# Patient Record
Sex: FEMALE | Race: WHITE | Employment: FULL TIME | URBAN - NONMETROPOLITAN AREA
[De-identification: names, ages, dates, MRNs, and addresses within clinical notes are randomized per-mention and may not be internally consistent; named-entity substitution may affect disease eponyms.]

---

## 2017-10-04 ENCOUNTER — OFFICE VISIT (OUTPATIENT)
Dept: FAMILY MEDICINE CLINIC | Age: 37
End: 2017-10-04
Payer: MEDICAID

## 2017-10-04 ENCOUNTER — TELEPHONE (OUTPATIENT)
Dept: FAMILY MEDICINE CLINIC | Age: 37
End: 2017-10-04

## 2017-10-04 VITALS
OXYGEN SATURATION: 97 % | HEART RATE: 72 BPM | TEMPERATURE: 97.7 F | BODY MASS INDEX: 29.72 KG/M2 | WEIGHT: 178.6 LBS | RESPIRATION RATE: 22 BRPM | DIASTOLIC BLOOD PRESSURE: 80 MMHG | SYSTOLIC BLOOD PRESSURE: 120 MMHG

## 2017-10-04 DIAGNOSIS — H66.003 ACUTE SUPPURATIVE OTITIS MEDIA OF BOTH EARS WITHOUT SPONTANEOUS RUPTURE OF TYMPANIC MEMBRANES, RECURRENCE NOT SPECIFIED: Primary | ICD-10-CM

## 2017-10-04 PROCEDURE — 99213 OFFICE O/P EST LOW 20 MIN: CPT | Performed by: NURSE PRACTITIONER

## 2017-10-04 RX ORDER — CEFTRIAXONE 1 G/1
1 INJECTION, POWDER, FOR SOLUTION INTRAMUSCULAR; INTRAVENOUS EVERY 24 HOURS
Status: SHIPPED | OUTPATIENT
Start: 2017-10-04 | End: 2017-10-08

## 2017-10-04 RX ORDER — CETIRIZINE HYDROCHLORIDE, PSEUDOEPHEDRINE HYDROCHLORIDE 5; 120 MG/1; MG/1
1 TABLET, FILM COATED, EXTENDED RELEASE ORAL 2 TIMES DAILY
Qty: 60 TABLET | Refills: 0 | Status: SHIPPED | OUTPATIENT
Start: 2017-10-04 | End: 2017-11-03

## 2017-10-04 RX ORDER — CIPROFLOXACIN HYDROCHLORIDE 3.5 MG/ML
2 SOLUTION/ DROPS TOPICAL
Qty: 240 DROP | Refills: 0 | Status: SHIPPED | OUTPATIENT
Start: 2017-10-04 | End: 2017-10-14

## 2017-10-04 RX ORDER — CEFUROXIME AXETIL 500 MG/1
500 TABLET ORAL 2 TIMES DAILY
Qty: 20 TABLET | Refills: 0 | Status: SHIPPED | OUTPATIENT
Start: 2017-10-04 | End: 2017-10-14

## 2017-10-04 ASSESSMENT — ENCOUNTER SYMPTOMS
VOMITING: 1
NAUSEA: 1
COUGH: 1
SORE THROAT: 1

## 2017-10-04 NOTE — PROGRESS NOTES
Constitutional: She is oriented to person, place, and time. She appears well-developed and well-nourished. HENT:   Head: Normocephalic and atraumatic. Right Ear: External ear normal. No tenderness. Tympanic membrane is bulging. Tympanic membrane is not injected. A middle ear effusion is present. Left Ear: External ear normal. No tenderness. Tympanic membrane is bulging. Tympanic membrane is not injected. A middle ear effusion is present. Nose: Nose normal. No rhinorrhea. Mouth/Throat: Oropharynx is clear and moist. No posterior oropharyngeal edema, posterior oropharyngeal erythema or tonsillar abscesses. Eyes: Conjunctivae are normal.   Neck: Normal range of motion. Neck supple. Cardiovascular: Normal rate, regular rhythm and normal heart sounds. Pulmonary/Chest: Effort normal and breath sounds normal.   Coughing in room   Abdominal: Soft. Bowel sounds are normal.   Lymphadenopathy:     She has no cervical adenopathy. Neurological: She is alert and oriented to person, place, and time. Skin: Skin is warm and dry. Psychiatric: She has a normal mood and affect. /80 (Site: Left Arm, Position: Sitting, Cuff Size: Large Adult)  Pulse 72  Temp 97.7 °F (36.5 °C) (Oral)   Resp 22  Wt 178 lb 9.6 oz (81 kg)  SpO2 97%  BMI 29.72 kg/m2    Assessment:          1. Acute suppurative otitis media of both ears without spontaneous rupture of tympanic membranes, recurrence not specified  cefTRIAXone (ROCEPHIN) injection 1 g    cefUROXime (CEFTIN) 500 MG tablet    ciprofloxacin-hydrocortisone (CIPRO HC) 0.2-1 % otic suspension    cetirizine-psuedoephedrine (ZYRTEC-D) 5-120 MG per extended release tablet        Plan:      Start Ceftin on Sunday  Pt to Call Dr. Sami Abdul and schedule an appointment.

## 2017-10-04 NOTE — MR AVS SNAPSHOT
After Visit Summary             Nathalia Perez   10/4/2017 12:15 PM   Office Visit    Description:  Female : 1980   Provider:  KETAN Tracey   Department:  [address] and Future Appointments         Below is a list of your follow-up and future appointments. This may not be a complete list as you may have made appointments directly with providers that we are not aware of or your providers may have made some for you. Please call your providers to confirm appointments. It is important to keep your appointments. Please bring your current insurance card, photo ID, co-pay, and all medication bottles to your appointment. If self-pay, payment is expected at the time of service. Your To-Do List     Future Appointments Provider Department Dept Phone    10/12/2017 8:30 AM KETAN Tracey [facility] [phone]    Please arrive 15 minutes prior to appointment, bring photo ID and insurance card. Information from Your Visit        Department     Name Address Phone Fax    [address] [phone]      You Were Seen for:         Comments    Acute suppurative otitis media of both ears without spontaneous rupture of tympanic membranes, recurrence not specified   [[id number]]         Vital Signs     Blood Pressure Pulse Temperature Respirations Weight Oxygen Saturation    120/80 (Site: Left Arm, Position: Sitting, Cuff Size: Large Adult) 72 97.7 °F (36.5 °C) (Oral) 22 178 lb 9.6 oz (81 kg) 97%    Body Mass Index Smoking Status                29.72 kg/m2 Current Every Day Smoker          Additional Information about your Body Mass Index (BMI)           Your BMI as listed above is considered overweight (25.0-29.9). BMI is an estimate of body fat, calculated from your height and weight.   The higher your BMI, the greater your risk of heart disease, high blood pressure, Stopped by:  KETAN Merchant       sertraline 100 MG tablet   Commonly known as:  ZOLOFT   Stopped by:  KETAN Merchant            Where to Get Your Medications      These medications were sent to [address], [address] [place]     Phone:  [phone]     cefUROXime 500 MG tablet    cetirizine-psuedoephedrine 5-120 MG per extended release tablet    ciprofloxacin-hydrocortisone 0.2-1 % otic suspension               Your Current Medications Are              cefUROXime (CEFTIN) 500 MG tablet Take 1 tablet by mouth 2 times daily for 10 days    ciprofloxacin-hydrocortisone (CIPRO HC) 0.2-1 % otic suspension Place 3 drops in ear(s) 2 times daily for 7 days    cetirizine-psuedoephedrine (ZYRTEC-D) 5-120 MG per extended release tablet Take 1 tablet by mouth 2 times daily    buPROPion (WELLBUTRIN) 75 MG tablet Take 1 tablet by mouth 2 times daily    levothyroxine (LEVOTHROID) 25 MCG tablet Take 1 tablet by mouth daily    Vitamin D, Cholecalciferol, 400 UNITS CAPS Take 800 Units by mouth daily    Multiple Vitamins-Minerals (MULTIVITAMIN ADULT PO) Take by mouth daily    Cetirizine-Pseudoephedrine (ZYRTEC-D PO) Take by mouth    albuterol sulfate HFA (VENTOLIN HFA) 108 (90 BASE) MCG/ACT inhaler Inhale 2 puffs into the lungs every 6 hours as needed for Wheezing      Allergies              Nsaids          Additional Information        Basic Information     Date Of Birth Sex Race Ethnicity Preferred Language    1980 Female White Non-/Non  English      Problem List as of 10/4/2017  Date Reviewed: 10/26/2016          None      Preventive Care        Date Due    HIV screening is recommended for all people regardless of risk factors  aged 15-65 years at least once (lifetime) who have never been HIV tested.  9/22/1994    Tetanus Combination Vaccine (1 - Tdap) 9/22/1998

## 2017-10-05 ENCOUNTER — NURSE ONLY (OUTPATIENT)
Dept: FAMILY MEDICINE CLINIC | Age: 37
End: 2017-10-05

## 2017-10-05 DIAGNOSIS — B96.89 ACUTE BACTERIAL SINUSITIS: Primary | ICD-10-CM

## 2017-10-05 DIAGNOSIS — J01.90 ACUTE BACTERIAL SINUSITIS: Primary | ICD-10-CM

## 2017-10-06 ENCOUNTER — NURSE ONLY (OUTPATIENT)
Dept: FAMILY MEDICINE CLINIC | Age: 37
End: 2017-10-06

## 2017-10-07 ENCOUNTER — NURSE ONLY (OUTPATIENT)
Dept: FAMILY MEDICINE CLINIC | Age: 37
End: 2017-10-07

## 2017-10-07 DIAGNOSIS — J06.9 UPPER RESPIRATORY TRACT INFECTION, UNSPECIFIED TYPE: Primary | ICD-10-CM

## 2017-10-07 PROCEDURE — 99999 PR OFFICE/OUTPT VISIT,PROCEDURE ONLY: CPT | Performed by: FAMILY MEDICINE

## 2019-03-07 ENCOUNTER — OFFICE VISIT (OUTPATIENT)
Dept: FAMILY MEDICINE CLINIC | Age: 39
End: 2019-03-07
Payer: MEDICAID

## 2019-03-07 VITALS
WEIGHT: 189 LBS | RESPIRATION RATE: 14 BRPM | HEART RATE: 78 BPM | DIASTOLIC BLOOD PRESSURE: 82 MMHG | OXYGEN SATURATION: 98 % | TEMPERATURE: 97.8 F | BODY MASS INDEX: 31.45 KG/M2 | SYSTOLIC BLOOD PRESSURE: 130 MMHG

## 2019-03-07 DIAGNOSIS — R05.9 COUGH: ICD-10-CM

## 2019-03-07 DIAGNOSIS — R06.2 WHEEZING: ICD-10-CM

## 2019-03-07 DIAGNOSIS — R68.89 FLU-LIKE SYMPTOMS: Primary | ICD-10-CM

## 2019-03-07 DIAGNOSIS — J40 BRONCHITIS: ICD-10-CM

## 2019-03-07 DIAGNOSIS — R52 BODY ACHES: ICD-10-CM

## 2019-03-07 LAB
INFLUENZA A ANTIBODY: NEGATIVE
INFLUENZA B ANTIBODY: NEGATIVE

## 2019-03-07 PROCEDURE — 87804 INFLUENZA ASSAY W/OPTIC: CPT | Performed by: INTERNAL MEDICINE

## 2019-03-07 PROCEDURE — 99213 OFFICE O/P EST LOW 20 MIN: CPT | Performed by: INTERNAL MEDICINE

## 2019-03-07 RX ORDER — PREDNISONE 20 MG/1
20 TABLET ORAL DAILY
Qty: 7 TABLET | Refills: 0 | Status: SHIPPED | OUTPATIENT
Start: 2019-03-07 | End: 2019-03-14

## 2019-03-07 RX ORDER — BENZONATATE 100 MG/1
100-200 CAPSULE ORAL 3 TIMES DAILY PRN
Qty: 60 CAPSULE | Refills: 0 | Status: SHIPPED | OUTPATIENT
Start: 2019-03-07 | End: 2019-03-14

## 2019-03-07 RX ORDER — TRIAMCINOLONE ACETONIDE 40 MG/ML
40 INJECTION, SUSPENSION INTRA-ARTICULAR; INTRAMUSCULAR ONCE
Status: COMPLETED | OUTPATIENT
Start: 2019-03-07 | End: 2019-03-07

## 2019-03-07 RX ORDER — AZITHROMYCIN 250 MG/1
250 TABLET, FILM COATED ORAL SEE ADMIN INSTRUCTIONS
Qty: 6 TABLET | Refills: 0 | Status: SHIPPED | OUTPATIENT
Start: 2019-03-07 | End: 2019-03-12

## 2019-03-07 RX ADMIN — TRIAMCINOLONE ACETONIDE 40 MG: 40 INJECTION, SUSPENSION INTRA-ARTICULAR; INTRAMUSCULAR at 09:08

## 2019-03-07 ASSESSMENT — ENCOUNTER SYMPTOMS
VOMITING: 0
BACK PAIN: 0
DIARRHEA: 0
NAUSEA: 0
SHORTNESS OF BREATH: 0
COUGH: 1
SORE THROAT: 1
CONSTIPATION: 0

## 2019-05-06 ENCOUNTER — OFFICE VISIT (OUTPATIENT)
Dept: FAMILY MEDICINE CLINIC | Age: 39
End: 2019-05-06
Payer: MEDICAID

## 2019-05-06 VITALS
OXYGEN SATURATION: 98 % | DIASTOLIC BLOOD PRESSURE: 76 MMHG | RESPIRATION RATE: 20 BRPM | BODY MASS INDEX: 31.32 KG/M2 | TEMPERATURE: 97.6 F | HEART RATE: 91 BPM | HEIGHT: 65 IN | SYSTOLIC BLOOD PRESSURE: 128 MMHG | WEIGHT: 188 LBS

## 2019-05-06 DIAGNOSIS — S90.852A FOREIGN BODY IN LEFT FOOT, INITIAL ENCOUNTER: Primary | ICD-10-CM

## 2019-05-06 PROCEDURE — 99213 OFFICE O/P EST LOW 20 MIN: CPT | Performed by: NURSE PRACTITIONER

## 2019-05-06 ASSESSMENT — PATIENT HEALTH QUESTIONNAIRE - PHQ9
1. LITTLE INTEREST OR PLEASURE IN DOING THINGS: 0
SUM OF ALL RESPONSES TO PHQ QUESTIONS 1-9: 0
SUM OF ALL RESPONSES TO PHQ9 QUESTIONS 1 & 2: 0
2. FEELING DOWN, DEPRESSED OR HOPELESS: 0
SUM OF ALL RESPONSES TO PHQ QUESTIONS 1-9: 0

## 2019-05-06 NOTE — PROGRESS NOTES
SUBJECTIVE:  Here today for stepping on something. Patient ID: Beverlee Schwab is a 43 y.o. female. HPI:   HPI   Stepped on something 2 months ago. Did not bleed, thinks it was something plastic on the floor, Keeps getting worse , no drainage no fever just a hardened area around it and tender to touch about on the head of the pain is secondary 3rd metatarsal walking on the side of her foot. Past Medical History:   Diagnosis Date    Cancer [facility])     ovarian cancer    Hyperlipidemia     Hypertension     Hypothyroidism       Prior to Visit Medications    Medication Sig Taking? Authorizing Provider   albuterol (PROVENTIL) (5 MG/ML) 0.5% nebulizer solution Take 1 mL by nebulization 4 times daily as needed for Wheezing Yes Wali Arriaga,    Nebulizers (AIRIAL COMPACT MINI NEBULIZER) MISC Take 1 mL by nebulization 4 times daily as needed for Wheezing Yes Wali Arriaga DO   levothyroxine (LEVOTHROID) 25 MCG tablet Take 1 tablet by mouth daily Yes [place] Moder APRN   Vitamin D, Cholecalciferol, 400 UNITS CAPS Take 800 Units by mouth daily Yes Marco Antonio Moder APRN   albuterol sulfate HFA (VENTOLIN HFA) 108 (90 BASE) MCG/ACT inhaler Inhale 2 puffs into the lungs every 6 hours as needed for Wheezing Yes Marco Antonio Moder, APRN   Multiple Vitamins-Minerals (MULTIVITAMIN ADULT PO) Take by mouth daily Yes Historical Provider, MD   buPROPion (WELLBUTRIN) 75 MG tablet Take 1 tablet by mouth 2 times daily  Willie Soto APRN   Cetirizine-Pseudoephedrine (ZYRTEC-D PO) Take by mouth  Historical Provider, MD      Allergies   Allergen Reactions    Nsaids        Review of Systems   Constitutional: Negative for fever. Musculoskeletal: Positive for gait problem and myalgias. OBJECTIVE:    Physical Exam   Constitutional: She is oriented to person, place, and time. She appears well-developed and well-nourished. HENT:   Head: Normocephalic and atraumatic.    Right Ear: External ear normal.   Left Ear: External ear normal.   Eyes: Conjunctivae and lids are normal.   Neck: Normal range of motion. Cardiovascular: Normal rate, regular rhythm and normal heart sounds. Pulmonary/Chest: Effort normal and breath sounds normal.   Abdominal: Normal appearance. Feet:   Left Foot:   Skin Integrity: Positive for callus (with hard area. tender ). Neurological: She is alert and oriented to person, place, and time. Skin: Skin is warm and dry. Psychiatric: She has a normal mood and affect. Her behavior is normal.      /76 (Site: Right Upper Arm, Position: Sitting, Cuff Size: Medium Adult)   Pulse 91   Temp 97.6 °F (36.4 °C) (Oral)   Resp 20   Ht 5' 5\" (1.651 m)   Wt 188 lb (85.3 kg)   SpO2 98%   BMI 31.28 kg/m²      ASSESSMENT:      ICD-10-CM    1. Foreign body in left foot, initial encounter S90.852A US EXTREMITY LEFT NON VASC LIMITED       PLAN:    Kerry Carrero: Foot Pain (left foot about 2 months ago stepped on a lego or some toy and now bottom of foot is swollen and sore )    After ultrasound place order for podiatry  Diagnosis and orders for this visit are above.

## 2019-05-07 ENCOUNTER — HOSPITAL ENCOUNTER (OUTPATIENT)
Dept: GENERAL RADIOLOGY | Age: 39
Discharge: HOME | End: 2019-05-07

## 2019-05-07 DIAGNOSIS — S90.852A FOREIGN BODY IN LEFT FOOT, INITIAL ENCOUNTER: Primary | ICD-10-CM

## 2019-05-07 DIAGNOSIS — S90.852A FOREIGN BODY IN LEFT FOOT, INITIAL ENCOUNTER: ICD-10-CM

## 2019-05-07 PROCEDURE — 76882 US LMTD JT/FCL EVL NVASC XTR: CPT

## 2019-09-20 ENCOUNTER — OFFICE VISIT (OUTPATIENT)
Dept: FAMILY MEDICINE CLINIC | Age: 39
End: 2019-09-20
Payer: MEDICAID

## 2019-09-20 VITALS
DIASTOLIC BLOOD PRESSURE: 84 MMHG | HEART RATE: 83 BPM | BODY MASS INDEX: 31.78 KG/M2 | SYSTOLIC BLOOD PRESSURE: 120 MMHG | OXYGEN SATURATION: 98 % | WEIGHT: 191 LBS | TEMPERATURE: 98 F | RESPIRATION RATE: 16 BRPM

## 2019-09-20 DIAGNOSIS — E55.9 VITAMIN D DEFICIENCY: ICD-10-CM

## 2019-09-20 DIAGNOSIS — R53.82 CHRONIC FATIGUE: ICD-10-CM

## 2019-09-20 DIAGNOSIS — R53.82 CHRONIC FATIGUE: Primary | ICD-10-CM

## 2019-09-20 LAB
ALBUMIN SERPL-MCNC: 4.3 G/DL (ref 3.5–5.2)
ALP BLD-CCNC: 79 U/L (ref 35–104)
ALT SERPL-CCNC: 31 U/L (ref 5–33)
ANION GAP SERPL CALCULATED.3IONS-SCNC: 15 MMOL/L (ref 7–19)
AST SERPL-CCNC: 27 U/L (ref 5–32)
BILIRUB SERPL-MCNC: 0.3 MG/DL (ref 0.2–1.2)
BUN BLDV-MCNC: 12 MG/DL (ref 6–20)
CALCIUM SERPL-MCNC: 9.5 MG/DL (ref 8.6–10)
CHLORIDE BLD-SCNC: 102 MMOL/L (ref 98–111)
CO2: 22 MMOL/L (ref 22–29)
CREAT SERPL-MCNC: 0.8 MG/DL (ref 0.5–0.9)
GFR NON-AFRICAN AMERICAN: >60
GLUCOSE BLD-MCNC: 102 MG/DL (ref 74–109)
HCT VFR BLD CALC: 46.4 % (ref 37–47)
HEMOGLOBIN: 15.4 G/DL (ref 12–16)
MCH RBC QN AUTO: 29.6 PG (ref 27–31)
MCHC RBC AUTO-ENTMCNC: 33.2 G/DL (ref 33–37)
MCV RBC AUTO: 89.2 FL (ref 81–99)
PDW BLD-RTO: 12.5 % (ref 11.5–14.5)
PLATELET # BLD: 331 K/UL (ref 130–400)
PMV BLD AUTO: 10.9 FL (ref 9.4–12.3)
POTASSIUM SERPL-SCNC: 3.9 MMOL/L (ref 3.5–5)
RBC # BLD: 5.2 M/UL (ref 4.2–5.4)
SODIUM BLD-SCNC: 139 MMOL/L (ref 136–145)
T4 FREE: 0.9 NG/DL (ref 0.9–1.7)
TOTAL PROTEIN: 7.4 G/DL (ref 6.6–8.7)
TSH SERPL DL<=0.05 MIU/L-ACNC: 2.47 UIU/ML (ref 0.27–4.2)
VITAMIN B-12: 412 PG/ML (ref 211–946)
VITAMIN D 25-HYDROXY: 37.3 NG/ML
WBC # BLD: 11.6 K/UL (ref 4.8–10.8)

## 2019-09-20 PROCEDURE — 99213 OFFICE O/P EST LOW 20 MIN: CPT | Performed by: NURSE PRACTITIONER

## 2019-09-20 RX ORDER — SWAB
800 SWAB, NON-MEDICATED MISCELLANEOUS DAILY
Qty: 60 CAPSULE | Refills: 5 | Status: SHIPPED | OUTPATIENT
Start: 2019-09-20 | End: 2019-11-22 | Stop reason: SDUPTHER

## 2019-09-20 RX ORDER — BUPROPION HYDROCHLORIDE 150 MG/1
150 TABLET, EXTENDED RELEASE ORAL 2 TIMES DAILY
Qty: 60 TABLET | Refills: 5 | Status: SHIPPED | OUTPATIENT
Start: 2019-09-20 | End: 2019-11-22 | Stop reason: SDUPTHER

## 2019-09-20 ASSESSMENT — ENCOUNTER SYMPTOMS: ABDOMINAL PAIN: 0

## 2019-09-20 NOTE — PATIENT INSTRUCTIONS
worse.     · You notice that your thyroid gland has grown or changed in size.     · You have constipation that is new or that gets worse.     · You cannot stand cold temperatures.     · You have heavy or irregular menstrual periods.     · You have other new symptoms. Where can you learn more? Go to [email or web address] and sign in to your [employer] account. Enter K454 in the [employer] box to learn more about \"Hashimoto's Thyroiditis: Care Instructions. \"     If you do not have an account, please click on the \"Sign Up Now\" link. Current as of: November 6, 2018  Content Version: 12.1  © 7349-2196 Healthwise, Incorporated. Care instructions adapted under license by [facility] ([facility]). If you have questions about a medical condition or this instruction, always ask your healthcare professional. [address] any warranty or liability for your use of this information.

## 2019-09-20 NOTE — PROGRESS NOTES
Moni Abbasi [facility]  [address] [address]  Dept: [phone]  Dept Fax: [phone]  Loc: [phone]    Kerry Mendez is a 43 y.o. female who presentstoday for her medical conditions/complaints as noted below. Dana Fitzpatrick is c/o of Hypothyroidism (follow up. doesn't think current dose is working); Nicotine Dependence; and Gas        HPI:     HPI  Pt needs thyroid level checked. She thinks dose is too low. She is having weight gain. She is also having gas pains. She is also having nicotine cravings.       Past Medical History:   Diagnosis Date    Cancer ([facility] [address]     ovarian cancer    Hyperlipidemia     Hypertension     Hypothyroidism       Past Surgical History:   Procedure Laterality Date    FOREIGN BODY REMOVAL Left     foot    HYSTERECTOMY      partial    INNER EAR SURGERY         Family History   Problem Relation Age of Onset    Cancer Mother         brain & skin    Diabetes Mother     High Blood Pressure Mother     High Cholesterol Mother     Heart Disease Father     High Cholesterol Father     High Blood Pressure Father     Cancer Father         prostrate    Cancer Maternal Grandmother     Cancer Maternal Grandfather     Cancer Paternal Grandmother     Cancer Paternal Grandfather        Social History     Tobacco Use    Smoking status: Current Every Day Smoker     Types: Cigarettes    Smokeless tobacco: Never Used   Substance Use Topics    Alcohol use: Not on file      Current Outpatient Medications   Medication Sig Dispense Refill    Vitamin D, Cholecalciferol, 400 units CAPS Take 800 Units by mouth daily 60 capsule 5    buPROPion (WELLBUTRIN SR) 150 MG extended release tablet Take 1 tablet by mouth 2 times daily 60 tablet 5    albuterol (PROVENTIL) (5 MG/ML) 0.5% nebulizer solution Take 1 mL by nebulization 4 times daily as needed for Wheezing 120 each 3    levothyroxine (LEVOTHROID) 25 MCG tablet Take 1 tablet by mouth daily 30

## 2019-09-25 ENCOUNTER — TELEPHONE (OUTPATIENT)
Dept: FAMILY MEDICINE CLINIC | Age: 39
End: 2019-09-25

## 2019-09-25 DIAGNOSIS — R79.89 ABNORMAL THYROID BLOOD TEST: ICD-10-CM

## 2019-09-25 DIAGNOSIS — D72.9 ABNORMAL WHITE BLOOD CELL: Primary | ICD-10-CM

## 2019-09-27 DIAGNOSIS — E03.8 SUBCLINICAL HYPOTHYROIDISM: ICD-10-CM

## 2019-09-27 RX ORDER — LEVOTHYROXINE SODIUM 0.05 MG/1
50 TABLET ORAL DAILY
Qty: 30 TABLET | Refills: 2 | Status: SHIPPED | OUTPATIENT
Start: 2019-09-27 | End: 2019-11-22 | Stop reason: SDUPTHER

## 2019-09-30 ENCOUNTER — TELEPHONE (OUTPATIENT)
Dept: FAMILY MEDICINE CLINIC | Age: 39
End: 2019-09-30

## 2019-09-30 RX ORDER — CETIRIZINE HYDROCHLORIDE, PSEUDOEPHEDRINE HYDROCHLORIDE 5; 120 MG/1; MG/1
1 TABLET, FILM COATED, EXTENDED RELEASE ORAL 2 TIMES DAILY
Qty: 60 TABLET | Refills: 2 | Status: SHIPPED | OUTPATIENT
Start: 2019-09-30 | End: 2019-12-29

## 2019-10-25 ENCOUNTER — OFFICE VISIT (OUTPATIENT)
Dept: FAMILY MEDICINE CLINIC | Age: 39
End: 2019-10-25
Payer: MEDICAID

## 2019-10-25 VITALS
BODY MASS INDEX: 32.45 KG/M2 | TEMPERATURE: 97.9 F | DIASTOLIC BLOOD PRESSURE: 82 MMHG | RESPIRATION RATE: 16 BRPM | SYSTOLIC BLOOD PRESSURE: 120 MMHG | WEIGHT: 195 LBS | HEART RATE: 78 BPM | OXYGEN SATURATION: 98 %

## 2019-10-25 DIAGNOSIS — E66.9 CLASS 1 OBESITY WITH SERIOUS COMORBIDITY AND BODY MASS INDEX (BMI) OF 32.0 TO 32.9 IN ADULT, UNSPECIFIED OBESITY TYPE: Primary | ICD-10-CM

## 2019-10-25 PROCEDURE — 99213 OFFICE O/P EST LOW 20 MIN: CPT | Performed by: NURSE PRACTITIONER

## 2019-10-25 RX ORDER — PHENTERMINE HYDROCHLORIDE 37.5 MG/1
37.5 TABLET ORAL
Qty: 30 TABLET | Refills: 0 | Status: SHIPPED | OUTPATIENT
Start: 2019-10-25 | End: 2019-11-22 | Stop reason: SDUPTHER

## 2019-10-25 ASSESSMENT — ENCOUNTER SYMPTOMS: SHORTNESS OF BREATH: 0

## 2019-11-22 ENCOUNTER — OFFICE VISIT (OUTPATIENT)
Dept: FAMILY MEDICINE CLINIC | Age: 39
End: 2019-11-22
Payer: MEDICAID

## 2019-11-22 VITALS
RESPIRATION RATE: 16 BRPM | DIASTOLIC BLOOD PRESSURE: 84 MMHG | BODY MASS INDEX: 31.95 KG/M2 | HEART RATE: 96 BPM | OXYGEN SATURATION: 98 % | SYSTOLIC BLOOD PRESSURE: 114 MMHG | WEIGHT: 192 LBS | TEMPERATURE: 98.2 F

## 2019-11-22 DIAGNOSIS — E55.9 VITAMIN D DEFICIENCY: ICD-10-CM

## 2019-11-22 DIAGNOSIS — E03.8 SUBCLINICAL HYPOTHYROIDISM: ICD-10-CM

## 2019-11-22 DIAGNOSIS — E66.9 CLASS 1 OBESITY WITH SERIOUS COMORBIDITY AND BODY MASS INDEX (BMI) OF 32.0 TO 32.9 IN ADULT, UNSPECIFIED OBESITY TYPE: ICD-10-CM

## 2019-11-22 PROCEDURE — 99213 OFFICE O/P EST LOW 20 MIN: CPT | Performed by: NURSE PRACTITIONER

## 2019-11-22 RX ORDER — BUPROPION HYDROCHLORIDE 150 MG/1
150 TABLET, EXTENDED RELEASE ORAL 2 TIMES DAILY
Qty: 60 TABLET | Refills: 5 | Status: SHIPPED | OUTPATIENT
Start: 2019-11-22 | End: 2019-12-13 | Stop reason: SINTOL

## 2019-11-22 RX ORDER — LEVOTHYROXINE SODIUM 0.05 MG/1
50 TABLET ORAL DAILY
Qty: 30 TABLET | Refills: 2 | Status: SHIPPED | OUTPATIENT
Start: 2019-11-22 | End: 2020-02-27 | Stop reason: SDUPTHER

## 2019-11-22 RX ORDER — SWAB
800 SWAB, NON-MEDICATED MISCELLANEOUS DAILY
Qty: 60 CAPSULE | Refills: 5 | Status: SHIPPED | OUTPATIENT
Start: 2019-11-22 | End: 2020-01-17 | Stop reason: SDUPTHER

## 2019-11-22 RX ORDER — PHENTERMINE HYDROCHLORIDE 37.5 MG/1
37.5 TABLET ORAL
Qty: 30 TABLET | Refills: 1 | Status: SHIPPED | OUTPATIENT
Start: 2019-11-22 | End: 2019-12-13 | Stop reason: SDUPTHER

## 2019-11-22 ASSESSMENT — ENCOUNTER SYMPTOMS: SHORTNESS OF BREATH: 0

## 2019-12-12 DIAGNOSIS — R79.89 ABNORMAL THYROID BLOOD TEST: ICD-10-CM

## 2019-12-12 DIAGNOSIS — D72.9 ABNORMAL WHITE BLOOD CELL: ICD-10-CM

## 2019-12-12 LAB
HCT VFR BLD CALC: 48.5 % (ref 37–47)
HEMOGLOBIN: 16 G/DL (ref 12–16)
MCH RBC QN AUTO: 30 PG (ref 27–31)
MCHC RBC AUTO-ENTMCNC: 33 G/DL (ref 33–37)
MCV RBC AUTO: 91 FL (ref 81–99)
PDW BLD-RTO: 12.2 % (ref 11.5–14.5)
PLATELET # BLD: 333 K/UL (ref 130–400)
PMV BLD AUTO: 10.8 FL (ref 9.4–12.3)
RBC # BLD: 5.33 M/UL (ref 4.2–5.4)
TSH SERPL DL<=0.05 MIU/L-ACNC: 2.82 UIU/ML (ref 0.27–4.2)
WBC # BLD: 10.4 K/UL (ref 4.8–10.8)

## 2019-12-13 ENCOUNTER — OFFICE VISIT (OUTPATIENT)
Dept: FAMILY MEDICINE CLINIC | Age: 39
End: 2019-12-13
Payer: MEDICAID

## 2019-12-13 VITALS
SYSTOLIC BLOOD PRESSURE: 110 MMHG | HEART RATE: 89 BPM | WEIGHT: 191 LBS | BODY MASS INDEX: 31.78 KG/M2 | RESPIRATION RATE: 16 BRPM | OXYGEN SATURATION: 97 % | DIASTOLIC BLOOD PRESSURE: 84 MMHG

## 2019-12-13 DIAGNOSIS — E66.9 CLASS 1 OBESITY WITH SERIOUS COMORBIDITY AND BODY MASS INDEX (BMI) OF 32.0 TO 32.9 IN ADULT, UNSPECIFIED OBESITY TYPE: ICD-10-CM

## 2019-12-13 PROCEDURE — 99213 OFFICE O/P EST LOW 20 MIN: CPT | Performed by: NURSE PRACTITIONER

## 2019-12-13 RX ORDER — PHENTERMINE HYDROCHLORIDE 37.5 MG/1
37.5 TABLET ORAL
Qty: 30 TABLET | Refills: 1 | Status: SHIPPED | OUTPATIENT
Start: 2019-12-13 | End: 2020-01-17 | Stop reason: SDUPTHER

## 2019-12-13 ASSESSMENT — ENCOUNTER SYMPTOMS: SHORTNESS OF BREATH: 0

## 2020-01-17 ENCOUNTER — OFFICE VISIT (OUTPATIENT)
Dept: FAMILY MEDICINE CLINIC | Age: 40
End: 2020-01-17
Payer: MEDICAID

## 2020-01-17 VITALS
DIASTOLIC BLOOD PRESSURE: 74 MMHG | BODY MASS INDEX: 31.45 KG/M2 | HEART RATE: 88 BPM | TEMPERATURE: 97.9 F | WEIGHT: 189 LBS | RESPIRATION RATE: 19 BRPM | SYSTOLIC BLOOD PRESSURE: 98 MMHG | OXYGEN SATURATION: 98 %

## 2020-01-17 PROCEDURE — 99213 OFFICE O/P EST LOW 20 MIN: CPT | Performed by: NURSE PRACTITIONER

## 2020-01-17 RX ORDER — SWAB
800 SWAB, NON-MEDICATED MISCELLANEOUS DAILY
Qty: 60 CAPSULE | Refills: 5 | Status: SHIPPED | OUTPATIENT
Start: 2020-01-17 | End: 2020-02-27 | Stop reason: SDUPTHER

## 2020-01-17 RX ORDER — PHENTERMINE HYDROCHLORIDE 37.5 MG/1
37.5 TABLET ORAL
Qty: 30 TABLET | Refills: 1 | Status: SHIPPED | OUTPATIENT
Start: 2020-01-17 | End: 2020-02-16

## 2020-01-17 ASSESSMENT — PATIENT HEALTH QUESTIONNAIRE - PHQ9
SUM OF ALL RESPONSES TO PHQ9 QUESTIONS 1 & 2: 0
1. LITTLE INTEREST OR PLEASURE IN DOING THINGS: 0
SUM OF ALL RESPONSES TO PHQ QUESTIONS 1-9: 0
2. FEELING DOWN, DEPRESSED OR HOPELESS: 0
SUM OF ALL RESPONSES TO PHQ QUESTIONS 1-9: 0

## 2020-01-17 ASSESSMENT — ENCOUNTER SYMPTOMS: SHORTNESS OF BREATH: 0

## 2020-01-17 NOTE — PROGRESS NOTES
Moni Abbasi [facility]  [address] [address]  Dept: [phone]  Dept Fax: [phone]  Loc: [phone]    Kerry Woodall is a 35 y.o. female who presentstoday for her medical conditions/complaints as noted below. Kerry Giron is c/o of Rash (on arms x 1 week); Medication Refill; Otalgia (right ear x 2 days); and Eye Problem (eye twitching x 2 weeks)        HPI:     HPI  Itchy red Rash (on arms x 2 weeks); Medication Refill for phentermine, has lost three pounds this month, still attending \[facility] [place] [facility]\""; Otalgia (right ear x 2 days); and Eye Problem (eye twitching x 2 weeks). She states the twitching and itching began around the same time. She has not changed anything in her household or changed any foods or taken any new meds or supplements.      Past Medical History:   Diagnosis Date    Cancer ([facility] [address]     ovarian cancer    Hyperlipidemia     Hypertension     Hypothyroidism       Past Surgical History:   Procedure Laterality Date    FOREIGN BODY REMOVAL Left     foot    HYSTERECTOMY      partial    INNER EAR SURGERY         Family History   Problem Relation Age of Onset    Cancer Mother         brain & skin    Diabetes Mother     High Blood Pressure Mother     High Cholesterol Mother     Heart Disease Father     High Cholesterol Father     High Blood Pressure Father     Cancer Father         prostrate    Cancer Maternal Grandmother     Cancer Maternal Grandfather     Cancer Paternal Grandmother     Cancer Paternal Grandfather        Social History     Tobacco Use    Smoking status: Current Every Day Smoker     Packs/day: 0.10     Types: Cigarettes     Start date: 10/25/2004    Smokeless tobacco: Never Used    Tobacco comment: one cigarette a day   Substance Use Topics    Alcohol use: Not on file      Current Outpatient Medications   Medication Sig Dispense Refill    Vitamin D, Cholecalciferol, 10 MCG (400 UNIT) CAPS Take 800 Units by mouth daily 60 capsule 5    phentermine (ADIPEX-P) 37.5 MG tablet Take 1 tablet by mouth every morning (before breakfast) for 30 days. 30 tablet 1    neomycin-polymyxin-hydrocortisone (CORTISPORIN) 3.5-52935-7 otic solution Place 4 drops into the right ear 3 times daily for 10 days Instill into right Ear 1 Bottle 0    levothyroxine (SYNTHROID) 50 MCG tablet Take 1 tablet by mouth daily 30 tablet 2    albuterol (PROVENTIL) (5 MG/ML) 0.5% nebulizer solution Take 1 mL by nebulization 4 times daily as needed for Wheezing 120 each 3    albuterol sulfate HFA (VENTOLIN HFA) 108 (90 BASE) MCG/ACT inhaler Inhale 2 puffs into the lungs every 6 hours as needed for Wheezing 1 Inhaler 3    Multiple Vitamins-Minerals (MULTIVITAMIN ADULT PO) Take by mouth daily      Nebulizers (AIRIAL COMPACT MINI NEBULIZER) MISC Take 1 mL by nebulization 4 times daily as needed for Wheezing 1 each 0     No current facility-administered medications for this visit. Allergies   Allergen Reactions    Nsaids        Health Maintenance   Topic Date Due    Pneumococcal 0-64 years Vaccine (1 of 1 - PPSV23) 09/22/1985    DTaP/Tdap/Td vaccine (1 - Tdap) 09/22/1990    HIV screen  09/22/1994    Cervical cancer screen  09/22/2000    Flu vaccine (1) 09/01/2019    Diabetes screen  09/22/2019    Lipid screen  09/27/2021       Subjective:      Review of Systems   HENT: Positive for ear pain. Respiratory: Negative for shortness of breath. Cardiovascular: Negative for chest pain and palpitations. Skin: Positive for rash. Objective:     Physical Exam  Constitutional:       Appearance: Normal appearance. She is not ill-appearing. HENT:      Ears:      Comments: Right TM with serous effusion, some small amount of white discharge in canal  Cardiovascular:      Rate and Rhythm: Normal rate and regular rhythm. Pulmonary:      Effort: Pulmonary effort is normal.      Breath sounds: Normal breath sounds. Skin:     Findings: Rash present.

## 2020-01-20 ENCOUNTER — TELEPHONE (OUTPATIENT)
Dept: FAMILY MEDICINE CLINIC | Age: 40
End: 2020-01-20

## 2020-01-20 NOTE — TELEPHONE ENCOUNTER
Received call about issue with ear drop script that was sent in. Returned call to pharmacy and they stated they do not have the hydrocortisone but can change to Dexamethasone. Discussed with Dr Sprague Hiss due to Selin Drake not in clinic and he stated this will be ok.      Called in to give verbal change, spoke with Tiffanie Garza

## 2020-02-27 ENCOUNTER — TELEPHONE (OUTPATIENT)
Dept: FAMILY MEDICINE CLINIC | Age: 40
End: 2020-02-27

## 2020-02-27 ENCOUNTER — OFFICE VISIT (OUTPATIENT)
Dept: FAMILY MEDICINE CLINIC | Age: 40
End: 2020-02-27
Payer: MEDICAID

## 2020-02-27 VITALS
SYSTOLIC BLOOD PRESSURE: 122 MMHG | DIASTOLIC BLOOD PRESSURE: 82 MMHG | HEIGHT: 66 IN | TEMPERATURE: 97.3 F | RESPIRATION RATE: 20 BRPM | BODY MASS INDEX: 30.22 KG/M2 | HEART RATE: 88 BPM | WEIGHT: 188 LBS | OXYGEN SATURATION: 99 %

## 2020-02-27 PROCEDURE — 99213 OFFICE O/P EST LOW 20 MIN: CPT | Performed by: NURSE PRACTITIONER

## 2020-02-27 RX ORDER — SWAB
800 SWAB, NON-MEDICATED MISCELLANEOUS DAILY
Qty: 60 CAPSULE | Refills: 5 | Status: SHIPPED | OUTPATIENT
Start: 2020-02-27 | End: 2020-11-03 | Stop reason: ALTCHOICE

## 2020-02-27 RX ORDER — CEFDINIR 300 MG/1
300 CAPSULE ORAL 2 TIMES DAILY
Qty: 20 CAPSULE | Refills: 0 | Status: SHIPPED | OUTPATIENT
Start: 2020-02-27 | End: 2020-03-08

## 2020-02-27 RX ORDER — ALBUTEROL SULFATE 90 UG/1
2 AEROSOL, METERED RESPIRATORY (INHALATION) EVERY 6 HOURS PRN
Qty: 1 INHALER | Refills: 3 | Status: SHIPPED | OUTPATIENT
Start: 2020-02-27 | End: 2020-11-03 | Stop reason: ALTCHOICE

## 2020-02-27 RX ORDER — PHENTERMINE HYDROCHLORIDE 37.5 MG/1
37.5 TABLET ORAL
Qty: 30 TABLET | Refills: 1 | Status: CANCELLED | OUTPATIENT
Start: 2020-02-27 | End: 2020-03-28

## 2020-02-27 RX ORDER — LEVOTHYROXINE SODIUM 0.05 MG/1
50 TABLET ORAL DAILY
Qty: 30 TABLET | Refills: 5 | Status: SHIPPED | OUTPATIENT
Start: 2020-02-27 | End: 2020-11-03 | Stop reason: ALTCHOICE

## 2020-02-27 SDOH — HEALTH STABILITY: MENTAL HEALTH: HOW OFTEN DO YOU HAVE A DRINK CONTAINING ALCOHOL?: NEVER

## 2020-02-27 ASSESSMENT — ENCOUNTER SYMPTOMS: COUGH: 1

## 2020-02-27 NOTE — PROGRESS NOTES
SUBJECTIVE:    Patient ID: Nay Richards is a42 y.o. female. Nya Richards is here today for Medication Refill (Patient presents for medication refills. )  . HPI:   HPI   Pt is here as new patient to me today. H/o left mechanical TM. And pt reports having h/o Dr. Himanshu Barry \"lifting and stitching\" right TM approx 5yrs ago. And pt reports having been sick last week and with coughing, nasal congestion pt began feeling \"bunch of fluid in it and pressure\"  No fever  No current pain  Decreased hearing    Pt is also requesting refill on nebulizer solution, thyroid medication. Pt has had TSH in December and was wnl. Pt also had CBC then. Last lipid was 2016 and lipids were elevated. Pt states that she has been taking phentermine for appetite control. She states last dose was approx 2wks ago and has taken intermittently since oct 2019. Past Medical History:   Diagnosis Date    Cancer [facility])     ovarian cancer    Hyperlipidemia     Hypertension     Hypothyroidism      Prior to Visit Medications    Medication Sig Taking?  Authorizing Provider   Vitamin D, Cholecalciferol, 10 MCG (400 UNIT) CAPS Take 800 Units by mouth daily Yes KETAN Christensen   levothyroxine (SYNTHROID) 50 MCG tablet Take 1 tablet by mouth daily Yes KETAN Christensen   albuterol (PROVENTIL) (5 MG/ML) 0.5% nebulizer solution Take 1 mL by nebulization 4 times daily as needed for Wheezing Yes KETAN Christensen   albuterol sulfate HFA (VENTOLIN HFA) 108 (90 Base) MCG/ACT inhaler Inhale 2 puffs into the lungs every 6 hours as needed for Wheezing Yes KETAN Christensen   cefdinir (OMNICEF) 300 MG capsule Take 1 capsule by mouth 2 times daily for 10 days Yes KETAN Christensen   ciprofloxacin-hydrocortisone (CIPRO HC) 0.2-1 % otic suspension Place 4 drops in ear(s) 2 times daily for 7 days Yes KETAN Christensen   Nebulizers (AIRIAL COMPACT MINI NEBULIZER) MISC Take 1 mL by nebulization 4 times daily as needed for Wheezing Yes Maxwell Marquez DO   Multiple Vitamins-Minerals (MULTIVITAMIN ADULT PO) Take by mouth daily Yes Historical Provider, MD     Allergies   Allergen Reactions    Nsaids      Past Surgical History:   Procedure Laterality Date    FOREIGN BODY REMOVAL Left     foot    HYSTERECTOMY      partial    INNER EAR SURGERY       Family History   Problem Relation Age of Onset    Cancer Mother         brain & skin    Diabetes Mother     High Blood Pressure Mother     High Cholesterol Mother     Heart Disease Father     High Cholesterol Father     High Blood Pressure Father     Cancer Father         prostrate    Cancer Maternal Grandmother     Cancer Maternal Grandfather     Cancer Paternal Grandmother     Cancer Paternal Grandfather      Social History     Socioeconomic History    Marital status:      Spouse name: Not on file    Number of children: Not on file    Years of education: Not on file    Highest education level: Not on file   Occupational History    Not on file   Social Needs    Financial resource strain: Not on file    Food insecurity:     Worry: Not on file     Inability: Not on file    Transportation needs:     Medical: Not on file     Non-medical: Not on file   Tobacco Use    Smoking status: Current Every Day Smoker     Packs/day: 0.50     Years: 10.00     Pack years: 5.00     Types: Cigarettes     Start date: 10/25/2004    Smokeless tobacco: Never Used   Substance and Sexual Activity    Alcohol use: Never     Alcohol/week: 0.0 standard drinks     Frequency: Never    Drug use: Never    Sexual activity: Not on file   Lifestyle    Physical activity:     Days per week: Not on file     Minutes per session: Not on file    Stress: Not on file   Relationships    Social connections:     Talks on phone: Not on file     Gets together: Not on file     Attends Evangelical service: Not on file     Active member of club or organization: Not on file     Attends meetings of clubs or organizations: Not on file Refill: Capillary refill takes less than 2 seconds. Neurological:      General: No focal deficit present. Mental Status: She is alert and oriented to person, place, and time. Psychiatric:         Mood and Affect: Mood normal.         Behavior: Behavior normal.         Thought Content: Thought content normal.         Judgment: Judgment normal.         /82 (Site: Left Upper Arm, Position: Sitting, Cuff Size: Large Adult)   Pulse 88   Temp 97.3 °F (36.3 °C) (Temporal)   Resp 20   Ht 5' 6\" (1.676 m)   Wt 188 lb (85.3 kg)   SpO2 99%   BMI 30.34 kg/m²      ASSESSMENT:      ICD-10-CM    1. Right acute otitis media H66.91 cefdinir (OMNICEF) 300 MG capsule     ciprofloxacin-hydrocortisone (CIPRO HC) 0.2-1 % otic suspension   2. Vitamin D deficiency E55.9 Vitamin D, Cholecalciferol, 10 MCG (400 UNIT) CAPS     Vitamin D 25 Hydroxy     Vitamin B12   3. Subclinical hypothyroidism E03.9 levothyroxine (SYNTHROID) 50 MCG tablet     Urinalysis Reflex to Culture     CBC Auto Differential     Comprehensive Metabolic Panel w/ Reflex to MG     Hemoglobin A1C     TSH without Reflex     T4, Free     Vitamin B12   4. Hyperlipidemia, unspecified hyperlipidemia type E78.5 Comprehensive Metabolic Panel w/ Reflex to MG     Hemoglobin A1C     Lipid Panel   5. Screening mammogram, encounter for Z12.31 Mammography screening bilateral       PLAN:    Kerry Carrero: Medication Refill (Patient presents for medication refills. )  Rec f/u with GYN  Fasting lab entered  Mammo ordered  Increase daily exercise as discussed  Plan as above  Diagnosis and orders for this visit are above. Please note that this chart was generated using dragon dictationsoftware. Although every effort was made to ensure the accuracy of this automated transcription, some errors in transcription may have occurred.

## 2020-07-09 ENCOUNTER — OFFICE VISIT (OUTPATIENT)
Dept: FAMILY MEDICINE CLINIC | Age: 40
End: 2020-07-09
Payer: MEDICAID

## 2020-07-09 VITALS
OXYGEN SATURATION: 98 % | HEART RATE: 83 BPM | SYSTOLIC BLOOD PRESSURE: 126 MMHG | TEMPERATURE: 97.2 F | RESPIRATION RATE: 20 BRPM | DIASTOLIC BLOOD PRESSURE: 82 MMHG

## 2020-07-09 PROCEDURE — 87880 STREP A ASSAY W/OPTIC: CPT | Performed by: NURSE PRACTITIONER

## 2020-07-09 PROCEDURE — 99213 OFFICE O/P EST LOW 20 MIN: CPT | Performed by: NURSE PRACTITIONER

## 2020-07-09 RX ORDER — TRIAMCINOLONE ACETONIDE 40 MG/ML
40 INJECTION, SUSPENSION INTRA-ARTICULAR; INTRAMUSCULAR ONCE
Status: COMPLETED | OUTPATIENT
Start: 2020-07-09 | End: 2020-07-09

## 2020-07-09 RX ORDER — LOPERAMIDE HYDROCHLORIDE 2 MG/1
2 CAPSULE ORAL 4 TIMES DAILY PRN
Qty: 20 CAPSULE | Refills: 0 | Status: SHIPPED | OUTPATIENT
Start: 2020-07-09 | End: 2020-07-19

## 2020-07-09 RX ORDER — ONDANSETRON 4 MG/1
4 TABLET, ORALLY DISINTEGRATING ORAL 3 TIMES DAILY PRN
Qty: 21 TABLET | Refills: 0 | Status: SHIPPED | OUTPATIENT
Start: 2020-07-09 | End: 2020-11-03 | Stop reason: ALTCHOICE

## 2020-07-09 RX ORDER — DEXAMETHASONE SODIUM PHOSPHATE 10 MG/ML
4 INJECTION INTRAMUSCULAR; INTRAVENOUS ONCE
Status: COMPLETED | OUTPATIENT
Start: 2020-07-09 | End: 2020-07-09

## 2020-07-09 RX ORDER — AZITHROMYCIN 250 MG/1
250 TABLET, FILM COATED ORAL SEE ADMIN INSTRUCTIONS
Qty: 6 TABLET | Refills: 0 | Status: SHIPPED | OUTPATIENT
Start: 2020-07-09 | End: 2020-07-14

## 2020-07-09 RX ADMIN — TRIAMCINOLONE ACETONIDE 40 MG: 40 INJECTION, SUSPENSION INTRA-ARTICULAR; INTRAMUSCULAR at 15:46

## 2020-07-09 RX ADMIN — DEXAMETHASONE SODIUM PHOSPHATE 4 MG: 10 INJECTION INTRAMUSCULAR; INTRAVENOUS at 15:46

## 2020-07-09 ASSESSMENT — ENCOUNTER SYMPTOMS
DIARRHEA: 1
COUGH: 1
VOMITING: 1
SORE THROAT: 0
NAUSEA: 1
SHORTNESS OF BREATH: 1

## 2020-07-09 NOTE — LETTER
[facility]  [address] [address]  Phone: [phone]  Fax: [phone]    KETAN Schwab        July 9, 2020     Patient: Armida Shah   YOB: 1980   Date of Visit: 7/9/2020       To Whom it May Concern:    Mike Light was seen in my clinic on 7/9/2020. She may return to work on once the test results of Covid are back and symptoms are gone. COVID testing includes isolation of family members living in the home. .    If you have any questions or concerns, please don't hesitate to call.     Sincerely,           KETAN Schwab

## 2020-07-09 NOTE — PROGRESS NOTES
7/9/2020    FLU/COVID-19 CLINIC    HPI:  Here today for cough, Super sick   SYMPTOMS:  Ms. Taryn Iniguez states that once her maybe even twice a year she will get some bronchitis type of symptoms but she feels like this is more than what is considered her normal bronchitis that she had been self isolating for pretty good while and she and her friend decided that they would go out yard sailing on July 4 and was started out a bit and managed to go all the way down to [place] was soon after that 2 days later that she started having some symptoms and has since progressed everyone in her household now this morning has woken up with similar symptoms she is here today to come to get tested and get treatment so we can progress to see how to treat everyone  Started on the July 4. Symptom duration, days:  [] 1   [] 2   [] 3   [x] 4 - 7  [] 8 - 10   [] 11 - 13   [] >14    [x] Fevers  {  [] Symptom (not measured)  [] Measured (Result:  degrees) 103 last night taking tylenol and motrin every 2 hours Family is sick  2 kids   [] Chills  [x] Cough  [] Coughing up blood    [] Chest Congestion  [x] Nasal Congestion  [x] Feeling short of breath  [] Sometimes  [x] Frequently    [] All the time    [x] Chest pain     [x] Headaches  [] Tolerable  [] Severe     [] Sore throat  [x] Muscle aches  [x] Nausea  [x] Vomiting  []Unable to keep fluids down     [x] Diarrhea  [x]Severe     [] OTHER SYMPTOMS:    Bottom of feet itch  Can't smell anything  Symptom course:   [x] Worsening     [] Stable     [] Improving  Review of Systems   Constitutional: Positive for fever. Negative for fatigue. HENT: Positive for congestion. Negative for sore throat. Respiratory: Positive for cough and shortness of breath. Cardiovascular: Positive for chest pain. Gastrointestinal: Positive for diarrhea, nausea and vomiting. Musculoskeletal: Positive for myalgias. Negative for arthralgias. Neurological: Positive for headaches. Negative for dizziness. RISK FACTORS:  [] Pregnant or possibly pregnant  [] Age over 61  [] Diabetes  [] Heart disease  [] Asthma  [] COPD/Other chronic lung diseases  [] Active Cancer  [] On Chemotherapy  [] Taking oral steroids  [] History Lymphoma/Leukemia  [] Close contact with a lab confirmed COVID-19 patient within 14 days of symptom onset  [] History of travel from affected geographical areas within 14 days of symptom onset     SOCIAL HISTORY:  Number of people living in patient's home (counting the patient as 1):  [] 1   [] 2   [] 3   [] 4   [] 5   [x] >6  July 3rd went yard sale. Started in [place] to [facility]. MEDICATIONS:  Prior to Visit Medications    Medication Sig Taking?  Authorizing Provider   loperamide (RA ANTI-DIARRHEAL) 2 MG capsule Take 1 capsule by mouth 4 times daily as needed for Diarrhea Yes KETAN Cain   azithromycin (ZITHROMAX) 250 MG tablet Take 1 tablet by mouth See Admin Instructions for 5 days 500mg on day 1 followed by 250mg on days 2 - 5 Yes KETAN Cain   ondansetron (ZOFRAN-ODT) 4 MG disintegrating tablet Take 1 tablet by mouth 3 times daily as needed for Nausea or Vomiting Yes KETAN Cain   Vitamin D, Cholecalciferol, 10 MCG (400 UNIT) CAPS Take 800 Units by mouth daily  KETAN Christensen   levothyroxine (SYNTHROID) 50 MCG tablet Take 1 tablet by mouth daily  KETAN Christensen   albuterol (PROVENTIL) (5 MG/ML) 0.5% nebulizer solution Take 1 mL by nebulization 4 times daily as needed for Wheezing  KETAN Christensen   albuterol sulfate HFA (VENTOLIN HFA) 108 (90 Base) MCG/ACT inhaler Inhale 2 puffs into the lungs every 6 hours as needed for Wheezing  KETAN Christensen   Nebulizers (AIRIAL COMPACT MINI NEBULIZER) MISC Take 1 mL by nebulization 4 times daily as needed for Wheezing  Pieter Ernst, DO   Multiple Vitamins-Minerals (MULTIVITAMIN ADULT PO) Take by mouth daily  Historical Provider, MD       Allergies   Allergen Reactions    Nsaids    ,   Past rigidity. Cardiovascular:      Rate and Rhythm: Normal rate and regular rhythm. Heart sounds: Normal heart sounds. No murmur. Pulmonary:      Effort: Pulmonary effort is normal. No respiratory distress. Breath sounds: Wheezing present. No decreased breath sounds, rhonchi or rales. Abdominal:      General: Bowel sounds are normal.      Palpations: Abdomen is soft. Musculoskeletal: Normal range of motion. Right lower leg: No edema. Left lower leg: No edema. Lymphadenopathy:      Cervical: No cervical adenopathy. Right cervical: No superficial cervical adenopathy. Left cervical: No superficial cervical adenopathy. Skin:     General: Skin is warm and dry. Coloration: Skin is not pale. Neurological:      Mental Status: She is alert and oriented to person, place, and time. Psychiatric:         Attention and Perception: Attention normal.         Mood and Affect: Mood normal.         Behavior: Behavior normal. Behavior is cooperative. TESTS:  POCT FLU:  [] Positive     []Negative    [x] COVID-19 Test sent    POCT STREP negative   Other testing:    ASSESSMENT/PLAN:  1. Acute bronchitis, unspecified organism    - azithromycin (ZITHROMAX) 250 MG tablet; Take 1 tablet by mouth See Admin Instructions for 5 days 500mg on day 1 followed by 250mg on days 2 - 5  Dispense: 6 tablet; Refill: 0  - dexamethasone (DECADRON) injection 4 mg  - triamcinolone acetonide (KENALOG-40) injection 40 mg  - COVID-19 Ambulatory    2. Diarrhea, unspecified type    - loperamide (RA ANTI-DIARRHEAL) 2 MG capsule; Take 1 capsule by mouth 4 times daily as needed for Diarrhea  Dispense: 20 capsule; Refill: 0  - COVID-19 Ambulatory    3. Nausea and vomiting, intractability of vomiting not specified, unspecified vomiting type    - ondansetron (ZOFRAN-ODT) 4 MG disintegrating tablet; Take 1 tablet by mouth 3 times daily as needed for Nausea or Vomiting  Dispense: 21 tablet;  Refill: 0  - COVID-19 Ambulatory    4. Suspected COVID-19 virus infection    - COVID-19 Ambulatory    5. Sore throat    - POCT rapid strep A      FOLLOW-UP:  No follow-ups on file. She is aware she needs to self quarantine until results are back and symptoms are gone long with her family members  In addition to other information, the printed after visit summary provided to the patient includes:  [] COVID-19 Self care instructions  [] COVID-19 General patient information    An  electronic signature was used to authenticate this note.     --KETAN Lentz on 7/9/2020 at 4:10 PM

## 2020-07-13 LAB
REPORT: NORMAL
SARS-COV-2: NOT DETECTED
THIS TEST SENT TO: NORMAL

## 2020-11-03 ENCOUNTER — NURSE TRIAGE (OUTPATIENT)
Dept: OTHER | Facility: CLINIC | Age: 40
End: 2020-11-03

## 2020-11-03 ENCOUNTER — OFFICE VISIT (OUTPATIENT)
Dept: FAMILY MEDICINE CLINIC | Age: 40
End: 2020-11-03
Payer: MEDICAID

## 2020-11-03 VITALS
TEMPERATURE: 96.9 F | OXYGEN SATURATION: 98 % | BODY MASS INDEX: 30.18 KG/M2 | HEART RATE: 79 BPM | DIASTOLIC BLOOD PRESSURE: 72 MMHG | SYSTOLIC BLOOD PRESSURE: 118 MMHG | WEIGHT: 187 LBS

## 2020-11-03 PROCEDURE — 99213 OFFICE O/P EST LOW 20 MIN: CPT | Performed by: NURSE PRACTITIONER

## 2020-11-03 RX ORDER — DAPSONE 100 MG/1
100 TABLET ORAL DAILY
Qty: 30 TABLET | Refills: 0 | Status: SHIPPED | OUTPATIENT
Start: 2020-11-03 | End: 2020-12-03

## 2020-11-03 RX ORDER — DIPHENHYDRAMINE HCL 25 MG
50 TABLET ORAL EVERY 6 HOURS PRN
Qty: 80 TABLET | Refills: 0 | Status: SHIPPED | OUTPATIENT
Start: 2020-11-03 | End: 2021-04-15

## 2020-11-03 RX ORDER — SULFAMETHOXAZOLE AND TRIMETHOPRIM 800; 160 MG/1; MG/1
1 TABLET ORAL 2 TIMES DAILY
Qty: 20 TABLET | Refills: 0 | Status: SHIPPED | OUTPATIENT
Start: 2020-11-03 | End: 2020-11-13

## 2020-11-03 RX ORDER — CIMETIDINE 400 MG/1
400 TABLET, FILM COATED ORAL 2 TIMES DAILY
Qty: 20 TABLET | Refills: 0 | Status: SHIPPED | OUTPATIENT
Start: 2020-11-03 | End: 2021-04-15

## 2020-11-03 RX ORDER — ASPIRIN/CALCIUM/MAG/ALUMINUM 325 MG
1 TABLET ORAL DAILY
Qty: 7 TABLET | Refills: 0 | Status: SHIPPED | OUTPATIENT
Start: 2020-11-03 | End: 2021-04-15

## 2020-11-03 ASSESSMENT — ENCOUNTER SYMPTOMS: COLOR CHANGE: 1

## 2020-11-03 NOTE — PROGRESS NOTES
SUBJECTIVE:  Here today for a spider bite  Patient ID: Caesar Encinas is a 38 y.o. female. HPI:   HPI   Happened 2 days ago and at work and itching area. Noticed red area worsen over the night area burning and itching red and lump no fever no drainage. See media pic  Past Medical History:   Diagnosis Date    Cancer ([facility] [address]     ovarian cancer    Hyperlipidemia     Hypertension     Hypothyroidism       Prior to Visit Medications    Medication Sig Taking? Authorizing Provider   mupirocin (BACTROBAN) 2 % ointment Apply 3 times daily. To breast Yes KETAN Santoyo   sulfamethoxazole-trimethoprim (BACTRIM DS) 800-160 MG per tablet Take 1 tablet by mouth 2 times daily for 10 days Yes Alison Juarez, APRN   dapsone 100 MG tablet Take 1 tablet by mouth daily Yes Alison Juarez, APRROBIN   Aspirin Buf,[id number], (BUFFERED ASPIRIN) 325 MG TABS Take 1 tablet by mouth daily for 7 days Yes Alison Juarez, APRN   cimetidine (TAGAMET) 400 MG tablet Take 1 tablet by mouth 2 times daily for 10 days Yes Alison Juarez APRN   diphenhydrAMINE (BENADRYL ALLERGY) 25 MG tablet Take 2 tablets by mouth every 6 hours as needed for Itching Yes Alisonyasmany Juarez APRN      Allergies   Allergen Reactions    Nsaids        Review of Systems   Constitutional: Negative for fever. Cardiovascular: Positive for chest pain (breast pain). Skin: Positive for color change and wound. OBJECTIVE:    Physical Exam  Constitutional:       Appearance: Normal appearance. She is well-developed. She is not ill-appearing. HENT:      Head: Normocephalic and atraumatic. Right Ear: External ear normal.      Left Ear: External ear normal.      Nose: Nose normal.      Mouth/Throat:      Lips: Pink. Mouth: Mucous membranes are moist.   Eyes:      General: Lids are normal.      Extraocular Movements: Extraocular movements intact. Conjunctiva/sclera: Conjunctivae normal.   Neck:      Musculoskeletal: Normal range of motion. Cardiovascular:      Rate and Rhythm: Normal rate and regular rhythm. Heart sounds: Normal heart sounds. No murmur. Pulmonary:      Effort: Pulmonary effort is normal.      Breath sounds: Normal breath sounds. Skin:     General: Skin is warm and dry. Findings: Erythema, rash and wound present. Rash is macular, papular and scaling. Neurological:      Mental Status: She is alert and oriented to person, place, and time. Motor: No weakness or tremor. Coordination: Coordination is intact. Psychiatric:         Attention and Perception: Attention and perception normal.         Mood and Affect: Mood normal.         Speech: Speech normal.         Behavior: Behavior normal. Behavior is cooperative. Thought Content: Thought content normal.         Cognition and Memory: Cognition and memory normal.         Judgment: Judgment normal.        /72 (Site: Left Upper Arm, Position: Sitting, Cuff Size: Medium Adult)   Pulse 79   Temp 96.9 °F (36.1 °C) (Temporal)   Wt 187 lb (84.8 kg)   SpO2 98%   BMI 30.18 kg/m²      ASSESSMENT:      ICD-10-CM    1. Brown recluse spider bite or sting, accidental or unintentional, initial encounter  T63.331A mupirocin (BACTROBAN) 2 % ointment     sulfamethoxazole-trimethoprim (BACTRIM DS) 800-160 MG per tablet     dapsone 100 MG tablet     Aspirin Buf,[id number], (BUFFERED ASPIRIN) 325 MG TABS     cimetidine (TAGAMET) 400 MG tablet     diphenhydrAMINE (BENADRYL ALLERGY) 25 MG tablet       PLAN:    Kerry Carrero: Insect Bite (possible spider bite on left breast )  RTC in one week   Medrol dose pack  ASA 325mg daily x 7 days  Bactrim DS BID x 10 days  Dapsone 100mg po daily for 7 days  Tagamet 400mg po BID x 10 days  Benadryl 50mg po q 6 hours scheduled x 10 days    Diagnosis and orders for this visit are above.

## 2020-11-03 NOTE — TELEPHONE ENCOUNTER
Reason for Disposition   Red or very tender (to touch) area, and started over 24 hours after the bite    Answer Assessment - Initial Assessment Questions  1. TYPE of SPIDER: \"What type of spider was it? \"  (e.g., name, unknown, or brief description)        Unsure if spider but believes it was, there are a lot of spiders at work area     2. LOCATION: \"Where is the bite located? \"        L breast area     3. PAIN: \"Is there any pain? \" If so, ask: \"How bad is it? \"  (Scale 1-10; or mild, moderate, severe)        Burns, itching, size of a half dollar 10/10 with no touch, 4/10 with no bra or anything touching it     4. SWELLING: \"How big is the swelling? \" (Inches, cm or compare to coins)         \" About the size of a second nipple\" and is black in color at this time, red around black area and warm to the touch     5. ONSET: \"When did the bite occur? \" (Minutes or hours ago)       3 days ago     6. TETANUS: \"When was the last tetanus booster? \"         Yes     7. OTHER SYMPTOMS: \"Do you have any other symptoms? \"  (e.g., muscle cramps, abdominal pain, change in urine color)         Denies    Protocols used: Mildred Carr 541 CHRIS-ADULT-OH    Patient called pre-service center [facility]  with red flag complaint. Brief description of triage: see above     Triage indicates for patient to seen today for possible spider bite    Care advice provided, patient verbalizes understanding; denies any other questions or concerns; instructed to call back for any new or worsening symptoms. Writer provided warm transfer to [facility]  at [facility] for appointment scheduling. Attention Provider: Thank you for allowing me to participate in the care of your patient. The patient was connected to triage in response to information provided to the [facility]. Please do not respond through this encounter as the response is not directed to a shared pool.

## 2020-11-12 ENCOUNTER — OFFICE VISIT (OUTPATIENT)
Dept: FAMILY MEDICINE CLINIC | Age: 40
End: 2020-11-12
Payer: MEDICAID

## 2020-11-12 VITALS
SYSTOLIC BLOOD PRESSURE: 118 MMHG | HEART RATE: 76 BPM | OXYGEN SATURATION: 98 % | TEMPERATURE: 98.2 F | RESPIRATION RATE: 20 BRPM | DIASTOLIC BLOOD PRESSURE: 72 MMHG

## 2020-11-12 PROCEDURE — 99213 OFFICE O/P EST LOW 20 MIN: CPT | Performed by: NURSE PRACTITIONER

## 2020-11-12 ASSESSMENT — ENCOUNTER SYMPTOMS: COLOR CHANGE: 0

## 2021-04-15 ENCOUNTER — OFFICE VISIT (OUTPATIENT)
Dept: PRIMARY CARE CLINIC | Age: 41
End: 2021-04-15
Payer: MEDICAID

## 2021-04-15 VITALS
HEART RATE: 84 BPM | OXYGEN SATURATION: 98 % | DIASTOLIC BLOOD PRESSURE: 88 MMHG | BODY MASS INDEX: 32.32 KG/M2 | HEIGHT: 65 IN | RESPIRATION RATE: 16 BRPM | SYSTOLIC BLOOD PRESSURE: 148 MMHG | TEMPERATURE: 98.2 F | WEIGHT: 194 LBS

## 2021-04-15 DIAGNOSIS — M79.674 PAIN OF RIGHT GREAT TOE: Primary | ICD-10-CM

## 2021-04-15 DIAGNOSIS — S91.209A AVULSION OF TOENAIL, INITIAL ENCOUNTER: ICD-10-CM

## 2021-04-15 PROCEDURE — 96372 THER/PROPH/DIAG INJ SC/IM: CPT | Performed by: NURSE PRACTITIONER

## 2021-04-15 RX ORDER — HYDROCODONE BITARTRATE AND ACETAMINOPHEN 5; 325 MG/1; MG/1
TABLET ORAL
COMMUNITY
Start: 2021-04-12 | End: 2021-06-28 | Stop reason: ALTCHOICE

## 2021-04-15 RX ORDER — CEPHALEXIN 500 MG/1
CAPSULE ORAL
COMMUNITY
Start: 2021-04-12 | End: 2021-06-28 | Stop reason: ALTCHOICE

## 2021-04-15 ASSESSMENT — ENCOUNTER SYMPTOMS
SHORTNESS OF BREATH: 0
ABDOMINAL PAIN: 0
SINUS PAIN: 0
CHEST TIGHTNESS: 0
SORE THROAT: 0
RHINORRHEA: 0
COUGH: 0

## 2021-04-15 NOTE — PATIENT INSTRUCTIONS
Will give patient Rocephin injection in the office today. She is to continue with oral antibiotics. She has appointment with Dr. Katia Loyola next Friday. Soak in Epsom salt at home and keep clean and dry. If increased redness or edema then return to PCP for re evaluation.

## 2021-04-15 NOTE — PROGRESS NOTES
Surgical History:   Procedure Laterality Date    FOREIGN BODY REMOVAL Left     foot    HYSTERECTOMY      partial    INNER EAR SURGERY       Family History   Problem Relation Age of Onset    Cancer Mother         brain & skin    Diabetes Mother     High Blood Pressure Mother     High Cholesterol Mother     Heart Disease Father     High Cholesterol Father     High Blood Pressure Father     Cancer Father         prostrate    Cancer Maternal Grandmother     Cancer Maternal Grandfather     Cancer Paternal Grandmother     Cancer Paternal Grandfather      Social History     Tobacco Use    Smoking status: Current Every Day Smoker     Packs/day: 0.50     Years: 10.00     Pack years: 5.00     Types: Cigarettes     Start date: 10/25/2004    Smokeless tobacco: Never Used   Substance Use Topics    Alcohol use: Never     Alcohol/week: 0.0 standard drinks     Frequency: Never      Current Outpatient Medications on File Prior to Visit   Medication Sig Dispense Refill    cephALEXin (KEFLEX) 500 MG capsule TAKE ONE CAPSULE BY MOUTH THREE TIMES DAILY      HYDROcodone-acetaminophen (NORCO) 5-325 MG per tablet TAKE ONE TABLET BY MOUTH EVERY 6 HOURS AS NEEDED FOR PAIN       No current facility-administered medications on file prior to visit. Allergies   Allergen Reactions    Nsaids      Health Maintenance   Topic Date Due    Hepatitis C screen  Never done    Pneumococcal 0-64 years Vaccine (1 of 1 - PPSV23) Never done    HIV screen  Never done    COVID-19 Vaccine (1) Never done    DTaP/Tdap/Td vaccine (1 - Tdap) Never done    Cervical cancer screen  Never done    Diabetes screen  Never done    Flu vaccine (Season Ended) 09/01/2021    Lipid screen  09/27/2021    Hepatitis A vaccine  Aged Out    Hepatitis B vaccine  Aged Out    Hib vaccine  Aged Out    Meningococcal (ACWY) vaccine  Aged Out       Subjective:   Review of Systems   Constitutional: Negative for chills, fatigue and fever.    HENT: Negative for congestion, ear pain, postnasal drip, rhinorrhea, sinus pain and sore throat. Respiratory: Negative for cough, chest tightness and shortness of breath. Cardiovascular: Negative for chest pain. Gastrointestinal: Negative for abdominal pain. Musculoskeletal: Positive for joint swelling (right great toe swollen . Nail partially avulsed). Skin: Negative for rash. Neurological: Negative for tingling and numbness. Objective:   BP (!) 148/88 (Site: Right Upper Arm, Position: Sitting)   Pulse 84   Temp 98.2 °F (36.8 °C) (Temporal)   Resp 16   Ht 5' 5\" (1.651 m)   Wt 194 lb (88 kg)   SpO2 98%   BMI 32.28 kg/m²    Physical Exam  Constitutional:       Appearance: Normal appearance. Cardiovascular:      Rate and Rhythm: Normal rate and regular rhythm. Pulmonary:      Effort: Pulmonary effort is normal.      Breath sounds: Normal breath sounds. Musculoskeletal:         General: Swelling present. Feet:    Neurological:      Mental Status: She is alert. No results found for this visit on 04/15/21. Assessment:      Diagnosis Orders   1. Pain of right great toe     2. Avulsion of toenail, initial encounter         Plan:   Kerry was seen today for toe pain. Diagnoses and all orders for this visit:    Pain of right great toe    Avulsion of toenail, initial encounter    Other orders  -     cefTRIAXone (ROCEPHIN) 1,000 mg in lidocaine 1 % 2.86 mL IM Injection       Will give patient Rocephin injection in the office today. She is to continue with oral antibiotics. She has appointment with Dr. Thomas Bernal next Friday. Soak in Epsom salt at home and keep clean and dry. If increased redness or edema then return to PCP for re evaluation. Return if symptoms worsen or fail to improve. Patient given educational materials- see patient instructions. Discussed use, benefit, and side effects of prescribedmedications. All patient questions answered. Pt voiced understanding.

## 2021-06-28 ENCOUNTER — OFFICE VISIT (OUTPATIENT)
Dept: FAMILY MEDICINE CLINIC | Age: 41
End: 2021-06-28
Payer: MEDICAID

## 2021-06-28 VITALS
HEART RATE: 67 BPM | TEMPERATURE: 97 F | WEIGHT: 192 LBS | OXYGEN SATURATION: 96 % | RESPIRATION RATE: 20 BRPM | SYSTOLIC BLOOD PRESSURE: 122 MMHG | DIASTOLIC BLOOD PRESSURE: 77 MMHG | BODY MASS INDEX: 31.95 KG/M2

## 2021-06-28 DIAGNOSIS — E03.9 HYPOTHYROIDISM, UNSPECIFIED TYPE: Primary | ICD-10-CM

## 2021-06-28 DIAGNOSIS — E78.2 MIXED HYPERLIPIDEMIA: ICD-10-CM

## 2021-06-28 DIAGNOSIS — J40 BRONCHITIS: ICD-10-CM

## 2021-06-28 DIAGNOSIS — R42 VERTIGO: ICD-10-CM

## 2021-06-28 PROCEDURE — 99214 OFFICE O/P EST MOD 30 MIN: CPT | Performed by: NURSE PRACTITIONER

## 2021-06-28 RX ORDER — TRIAMCINOLONE ACETONIDE 40 MG/ML
40 INJECTION, SUSPENSION INTRA-ARTICULAR; INTRAMUSCULAR ONCE
Status: COMPLETED | OUTPATIENT
Start: 2021-06-28 | End: 2021-06-28

## 2021-06-28 RX ORDER — SCOLOPAMINE TRANSDERMAL SYSTEM 1 MG/1
1 PATCH, EXTENDED RELEASE TRANSDERMAL
Qty: 3 PATCH | Refills: 0 | Status: SHIPPED | OUTPATIENT
Start: 2021-06-28 | End: 2021-07-07

## 2021-06-28 RX ORDER — DEXAMETHASONE SODIUM PHOSPHATE 4 MG/ML
4 INJECTION, SOLUTION INTRA-ARTICULAR; INTRALESIONAL; INTRAMUSCULAR; INTRAVENOUS; SOFT TISSUE ONCE
Status: COMPLETED | OUTPATIENT
Start: 2021-06-28 | End: 2021-06-28

## 2021-06-28 RX ADMIN — DEXAMETHASONE SODIUM PHOSPHATE 4 MG: 4 INJECTION, SOLUTION INTRA-ARTICULAR; INTRALESIONAL; INTRAMUSCULAR; INTRAVENOUS; SOFT TISSUE at 14:12

## 2021-06-28 RX ADMIN — TRIAMCINOLONE ACETONIDE 40 MG: 40 INJECTION, SUSPENSION INTRA-ARTICULAR; INTRAMUSCULAR at 14:13

## 2021-06-28 SDOH — ECONOMIC STABILITY: FOOD INSECURITY: WITHIN THE PAST 12 MONTHS, YOU WORRIED THAT YOUR FOOD WOULD RUN OUT BEFORE YOU GOT MONEY TO BUY MORE.: NEVER TRUE

## 2021-06-28 SDOH — ECONOMIC STABILITY: FOOD INSECURITY: WITHIN THE PAST 12 MONTHS, THE FOOD YOU BOUGHT JUST DIDN'T LAST AND YOU DIDN'T HAVE MONEY TO GET MORE.: NEVER TRUE

## 2021-06-28 ASSESSMENT — ENCOUNTER SYMPTOMS
NAUSEA: 0
ABDOMINAL PAIN: 0
SHORTNESS OF BREATH: 0
TROUBLE SWALLOWING: 0
COUGH: 1
RHINORRHEA: 0
DIARRHEA: 0

## 2021-06-28 ASSESSMENT — SOCIAL DETERMINANTS OF HEALTH (SDOH): HOW HARD IS IT FOR YOU TO PAY FOR THE VERY BASICS LIKE FOOD, HOUSING, MEDICAL CARE, AND HEATING?: NOT HARD AT ALL

## 2021-06-28 ASSESSMENT — PATIENT HEALTH QUESTIONNAIRE - PHQ9
SUM OF ALL RESPONSES TO PHQ QUESTIONS 1-9: 0
SUM OF ALL RESPONSES TO PHQ QUESTIONS 1-9: 0
2. FEELING DOWN, DEPRESSED OR HOPELESS: 0
SUM OF ALL RESPONSES TO PHQ9 QUESTIONS 1 & 2: 0
1. LITTLE INTEREST OR PLEASURE IN DOING THINGS: 0
SUM OF ALL RESPONSES TO PHQ QUESTIONS 1-9: 0

## 2021-06-28 NOTE — PATIENT INSTRUCTIONS
After obtaining consent, and per orders of Dr. Thane Duane, injection of Decadron and kenalog  given in Right upper quad. gluteus by Rubens Salgado. Patient instructed to remain in clinic for 20 minutes afterwards, and to report any adverse reaction to me immediately.

## 2021-06-28 NOTE — PROGRESS NOTES
Left Ear: Tympanic membrane, ear canal and external ear normal. There is no impacted cerumen. Nose: Nose normal.      Mouth/Throat:      Lips: Pink. Mouth: Mucous membranes are moist.      Dentition: Normal dentition. Pharynx: Oropharynx is clear. Uvula midline. Eyes:      General: Lids are normal.         Right eye: No discharge. Left eye: No discharge. Extraocular Movements: Extraocular movements intact. Conjunctiva/sclera: Conjunctivae normal.      Right eye: Right conjunctiva is not injected. Left eye: Left conjunctiva is not injected. Pupils: Pupils are equal, round, and reactive to light. Neck:      Thyroid: No thyromegaly. Vascular: No carotid bruit or JVD. Cardiovascular:      Rate and Rhythm: Normal rate and regular rhythm. Pulses: Normal pulses. Carotid pulses are 2+ on the right side and 2+ on the left side. Radial pulses are 2+ on the right side and 2+ on the left side. Heart sounds: Normal heart sounds, S1 normal and S2 normal. No murmur heard. Pulmonary:      Effort: Pulmonary effort is normal.      Breath sounds: Normal breath sounds. Abdominal:      General: Bowel sounds are normal. There is no distension or abdominal bruit. Palpations: Abdomen is soft. There is no mass. Hernia: No hernia is present. Musculoskeletal:         General: Normal range of motion. Cervical back: Full passive range of motion without pain, normal range of motion and neck supple. Right lower leg: No edema. Left lower leg: No edema. Lymphadenopathy:      Cervical: No cervical adenopathy. Right cervical: No superficial cervical adenopathy. Left cervical: No superficial cervical adenopathy. Upper Body:      Right upper body: No supraclavicular adenopathy. Left upper body: No supraclavicular adenopathy. Skin:     General: Skin is warm and dry. Coloration: Skin is not pale.       Findings: No lesion or rash. Nails: There is no clubbing. Neurological:      Mental Status: She is alert and oriented to person, place, and time. Motor: No weakness or tremor. Coordination: Coordination normal.      Deep Tendon Reflexes: Reflexes are normal and symmetric. Psychiatric:         Attention and Perception: Attention normal.         Mood and Affect: Mood normal.         Speech: Speech normal.         Behavior: Behavior normal. Behavior is cooperative. Thought Content: Thought content normal.         Cognition and Memory: Cognition and memory normal.         Judgment: Judgment normal.        /77 (Site: Left Upper Arm, Position: Sitting, Cuff Size: Large Adult)   Pulse 67   Temp 97 °F (36.1 °C) (Temporal)   Resp 20   Wt 192 lb (87.1 kg)   SpO2 96%   BMI 31.95 kg/m²      ASSESSMENT:      ICD-10-CM    1. Hypothyroidism, unspecified type  E03.9 TSH WITH REFLEX TO FT4     Thyroid Peroxidase Antibody     T3, Free     Thyroid Stimulating Immunoglobulin   2. Mixed hyperlipidemia  E78.2 Comprehensive Metabolic Panel     Lipid, Fasting   3. Bronchitis  J40 dexamethasone (DECADRON) injection 4 mg     triamcinolone acetonide (KENALOG-40) injection 40 mg   4. Vertigo  R42 scopolamine (TRANSDERM-SCOP, 1.5 MG,) transdermal patch       PLAN:    Kerry Carrero: Check-Up (needs thyroid check up . no energy, losing hair, gaining weight . ), Cough (still has a cough mostly at night . was sick about 6 weeks ago still has cough ), and Nausea (motion sickness . needs transdermal patches for a cruise )  review las and consider restart of meds. Diagnosis and orders for this visit are above.

## 2021-06-29 DIAGNOSIS — E78.2 MIXED HYPERLIPIDEMIA: ICD-10-CM

## 2021-06-29 DIAGNOSIS — E03.9 HYPOTHYROIDISM, UNSPECIFIED TYPE: ICD-10-CM

## 2021-06-29 LAB
ALBUMIN SERPL-MCNC: 4.4 G/DL (ref 3.5–5.2)
ALP BLD-CCNC: 79 U/L (ref 35–104)
ALT SERPL-CCNC: 23 U/L (ref 5–33)
ANION GAP SERPL CALCULATED.3IONS-SCNC: 12 MMOL/L (ref 7–19)
AST SERPL-CCNC: 15 U/L (ref 5–32)
BILIRUB SERPL-MCNC: 0.3 MG/DL (ref 0.2–1.2)
BUN BLDV-MCNC: 12 MG/DL (ref 6–20)
CALCIUM SERPL-MCNC: 9.4 MG/DL (ref 8.6–10)
CHLORIDE BLD-SCNC: 104 MMOL/L (ref 98–111)
CHOLESTEROL, FASTING: 236 MG/DL (ref 160–199)
CO2: 22 MMOL/L (ref 22–29)
CREAT SERPL-MCNC: 0.6 MG/DL (ref 0.5–0.9)
GFR AFRICAN AMERICAN: >59
GFR NON-AFRICAN AMERICAN: >60
GLUCOSE BLD-MCNC: 128 MG/DL (ref 74–109)
HDLC SERPL-MCNC: 35 MG/DL (ref 65–121)
LDL CHOLESTEROL CALCULATED: 180 MG/DL
POTASSIUM SERPL-SCNC: 4.2 MMOL/L (ref 3.5–5)
SODIUM BLD-SCNC: 138 MMOL/L (ref 136–145)
T3 FREE: 2.2 PG/ML (ref 2–4.4)
TOTAL PROTEIN: 7.5 G/DL (ref 6.6–8.7)
TRIGLYCERIDE, FASTING: 105 MG/DL (ref 0–149)
TSH REFLEX FT4: 0.87 UIU/ML (ref 0.35–5.5)

## 2021-07-01 ENCOUNTER — OFFICE VISIT (OUTPATIENT)
Dept: FAMILY MEDICINE CLINIC | Age: 41
End: 2021-07-01
Payer: MEDICAID

## 2021-07-01 VITALS
TEMPERATURE: 96.6 F | WEIGHT: 196 LBS | SYSTOLIC BLOOD PRESSURE: 120 MMHG | DIASTOLIC BLOOD PRESSURE: 80 MMHG | HEART RATE: 68 BPM | BODY MASS INDEX: 31.5 KG/M2 | OXYGEN SATURATION: 98 % | HEIGHT: 66 IN

## 2021-07-01 DIAGNOSIS — R73.09 ELEVATED GLUCOSE: Primary | ICD-10-CM

## 2021-07-01 DIAGNOSIS — H66.004 RECURRENT ACUTE SUPPURATIVE OTITIS MEDIA OF RIGHT EAR WITHOUT SPONTANEOUS RUPTURE OF TYMPANIC MEMBRANE: ICD-10-CM

## 2021-07-01 DIAGNOSIS — E78.2 MIXED HYPERLIPIDEMIA: ICD-10-CM

## 2021-07-01 LAB — THYROID PEROXIDASE (TPO) ABS: 0.9 IU/ML (ref 0–9)

## 2021-07-01 PROCEDURE — 99213 OFFICE O/P EST LOW 20 MIN: CPT | Performed by: NURSE PRACTITIONER

## 2021-07-01 RX ORDER — AMOXICILLIN AND CLAVULANATE POTASSIUM 875; 125 MG/1; MG/1
1 TABLET, FILM COATED ORAL 2 TIMES DAILY
Qty: 20 TABLET | Refills: 0 | Status: SHIPPED | OUTPATIENT
Start: 2021-07-01 | End: 2021-07-11

## 2021-07-01 RX ORDER — FLUTICASONE PROPIONATE 50 MCG
2 SPRAY, SUSPENSION (ML) NASAL DAILY
Qty: 1 BOTTLE | Refills: 0 | Status: SHIPPED | OUTPATIENT
Start: 2021-07-01 | End: 2021-10-20

## 2021-07-01 RX ORDER — ATORVASTATIN CALCIUM 20 MG/1
20 TABLET, FILM COATED ORAL DAILY
Qty: 30 TABLET | Refills: 5 | Status: SHIPPED | OUTPATIENT
Start: 2021-07-01

## 2021-07-01 RX ORDER — MONTELUKAST SODIUM 10 MG/1
10 TABLET ORAL NIGHTLY
Qty: 90 TABLET | Refills: 1 | Status: SHIPPED | OUTPATIENT
Start: 2021-07-01 | End: 2021-10-20

## 2021-07-01 ASSESSMENT — ENCOUNTER SYMPTOMS
SHORTNESS OF BREATH: 0
RHINORRHEA: 1
VOICE CHANGE: 1
COUGH: 0
SORE THROAT: 1

## 2021-07-01 NOTE — PROGRESS NOTES
SUBJECTIVE:  She is here today for allergies and also discussed medications  Patient ID: James Salcido is a 38 y.o. female. HPI:   HPI   Labs  She has had problems before in the past for her thyroid and I think she truly thought that her thyroid was the problem for her hair loss and weight gain however T4 T3 and TSH have been normal I did put in a request for a TPO and TPI which are not back yet so we will take a look at those levels when they get back  Glucose was elevated at 128 she was fasting she is willing to come back and do an insulin resistance test as well as a A1c  She does have elevated cholesterol levels which she is willing to take a statin so were going to start that    Allergies: eyes watery and itchy, not coughing throat is hoarse. Tried clearing. The symptoms are coming new she does have a lot of history of ear infections she states that she does not really have ear pain anymore because she has had them for so long her right ear is red and inflamed the left ear is a mechanical eardrum  Past Medical History:   Diagnosis Date    Cancer ([facility] [address]     ovarian cancer    Hyperlipidemia     Hypertension     Hypothyroidism       Prior to Visit Medications    Medication Sig Taking? Authorizing Provider   atorvastatin (LIPITOR) 20 MG tablet Take 1 tablet by mouth daily Yes KETAN Vernon   fluticasone (FLONASE) 50 MCG/ACT nasal spray 2 sprays by Each Nostril route daily Yes KETAN Vernon   montelukast (SINGULAIR) 10 MG tablet Take 1 tablet by mouth nightly Yes KETAN Vernon   amoxicillin-clavulanate (AUGMENTIN) 875-125 MG per tablet Take 1 tablet by mouth 2 times daily for 10 days Yes KETAN Vernon   scopolamine (TRANSDERM-SCOP, 1.5 MG,) transdermal patch Place 1 patch onto the skin every 72 hours for 9 days Yes KETAN Vernon      Allergies   Allergen Reactions    Nsaids        Review of Systems   Constitutional: Positive for unexpected weight change.  Negative for fever.   HENT: Positive for postnasal drip, rhinorrhea, sore throat and voice change. Negative for ear pain. Respiratory: Negative for cough and shortness of breath. Allergic/Immunologic: Positive for environmental allergies. OBJECTIVE:    Physical Exam  Constitutional:       Appearance: She is well-developed. HENT:      Head: Normocephalic and atraumatic. Right Ear: Tympanic membrane, ear canal and external ear normal. No drainage or tenderness. No middle ear effusion. There is no impacted cerumen. Tympanic membrane is not injected or bulging. Left Ear: Tympanic membrane, ear canal and external ear normal. No drainage or tenderness. No middle ear effusion. There is no impacted cerumen. Tympanic membrane is not injected or bulging. Nose: Nose normal. No congestion or rhinorrhea. Right Sinus: No maxillary sinus tenderness or frontal sinus tenderness. Left Sinus: No maxillary sinus tenderness or frontal sinus tenderness. Mouth/Throat:      Lips: Pink. Mouth: Mucous membranes are moist. No oral lesions. Dentition: No gum lesions. Pharynx: Oropharynx is clear. No oropharyngeal exudate, posterior oropharyngeal erythema or uvula swelling. Tonsils: No tonsillar exudate or tonsillar abscesses. Eyes:      General: Lids are normal.         Right eye: No discharge. Left eye: No discharge. Extraocular Movements: Extraocular movements intact. Conjunctiva/sclera: Conjunctivae normal.      Right eye: Right conjunctiva is not injected. No exudate. Left eye: Left conjunctiva is not injected. No exudate. Cardiovascular:      Rate and Rhythm: Normal rate and regular rhythm. Heart sounds: Normal heart sounds. No murmur heard. Pulmonary:      Effort: Pulmonary effort is normal. No respiratory distress. Breath sounds: Normal breath sounds. No decreased breath sounds, wheezing, rhonchi or rales.    Abdominal:      General: Bowel sounds are normal.      Palpations: Abdomen is soft. Musculoskeletal:         General: Normal range of motion. Cervical back: Normal range of motion and neck supple. No rigidity. Normal range of motion. Right lower leg: No edema. Left lower leg: No edema. Lymphadenopathy:      Cervical: No cervical adenopathy. Right cervical: No superficial cervical adenopathy. Left cervical: No superficial cervical adenopathy. Skin:     General: Skin is warm and dry. Coloration: Skin is not pale. Neurological:      Mental Status: She is alert and oriented to person, place, and time. Psychiatric:         Attention and Perception: Attention normal.         Mood and Affect: Mood normal.         Behavior: Behavior normal. Behavior is cooperative. /80 (Site: Left Upper Arm, Position: Sitting, Cuff Size: Medium Adult)   Pulse 68   Temp 96.6 °F (35.9 °C) (Temporal)   Ht 5' 6\" (1.676 m)   Wt 196 lb (88.9 kg)   SpO2 98%   BMI 31.64 kg/m²      ASSESSMENT:      ICD-10-CM    1. Elevated glucose  R73.09 Hemoglobin A1C     Insulin Free & Total   2. Mixed hyperlipidemia  E78.2 atorvastatin (LIPITOR) 20 MG tablet   3. Recurrent acute suppurative otitis media of right ear without spontaneous rupture of tympanic membrane  H66.004 fluticasone (FLONASE) 50 MCG/ACT nasal spray     montelukast (SINGULAIR) 10 MG tablet     amoxicillin-clavulanate (AUGMENTIN) 875-125 MG per tablet       PLAN:    Kerry Carrero: Discuss Labs, Laryngitis (scratchy voice), and Other (itchy watery eyes)    Review labs   Will come back tomorrow for labs   Diagnosis and orders for this visit are above.

## 2021-07-02 DIAGNOSIS — R73.09 ELEVATED GLUCOSE: ICD-10-CM

## 2021-07-02 LAB
HBA1C MFR BLD: 5.8 % (ref 4–6)
THYROID STIMULATING IMMUNOGLOBULIN: <0.1 IU/L

## 2021-07-05 LAB
INSULIN FREE: 22 UIU/ML (ref 3–19)
INSULIN: 27 UIU/ML (ref 3–19)

## 2021-07-06 ENCOUNTER — OFFICE VISIT (OUTPATIENT)
Dept: FAMILY MEDICINE CLINIC | Age: 41
End: 2021-07-06
Payer: MEDICAID

## 2021-07-06 VITALS
HEIGHT: 66 IN | BODY MASS INDEX: 30.53 KG/M2 | TEMPERATURE: 97.3 F | DIASTOLIC BLOOD PRESSURE: 78 MMHG | SYSTOLIC BLOOD PRESSURE: 130 MMHG | HEART RATE: 83 BPM | WEIGHT: 190 LBS | OXYGEN SATURATION: 98 %

## 2021-07-06 DIAGNOSIS — E88.81 INSULIN RESISTANCE: Primary | ICD-10-CM

## 2021-07-06 PROCEDURE — 99213 OFFICE O/P EST LOW 20 MIN: CPT | Performed by: NURSE PRACTITIONER

## 2021-07-06 RX ORDER — LANCETS 30 GAUGE
1 EACH MISCELLANEOUS DAILY
Qty: 100 EACH | Refills: 0 | Status: SHIPPED | OUTPATIENT
Start: 2021-07-06

## 2021-07-06 RX ORDER — GLUCOSAMINE HCL/CHONDROITIN SU 500-400 MG
CAPSULE ORAL
Qty: 100 STRIP | Refills: 1 | Status: SHIPPED | OUTPATIENT
Start: 2021-07-06 | End: 2021-10-20

## 2021-07-06 NOTE — PROGRESS NOTES
SUBJECTIVE:  Lab Results   Patient ID: Evan Levin is a 38 y.o. female. HPI:   HPI   Levels of Insulin are elevated. Having trouble loosing weight is exercising and watching diet. Doesn't eat sweets. Glucose was elevated at 128 so we did a hemoglobin A1c which showed a normal A1c was 5.8 however her insulin free and total was elevated which indicates that she might be a precursor to diabetes she is interested in starting medication we discussed how Glucophage works she would like to also monitor to be able to monitor her blood sugar to see how well it is controlled we have ordered that the Glucophage we talked about starting to half a tablet twice a day and then gradually advancing on based on her blood sugars will help as indicate how well we are managing her glucose she is to return in 2 weeks with the information record    Past Medical History:   Diagnosis Date    Cancer ([facility] [address]     ovarian cancer    Hyperlipidemia     Hypertension     Hypothyroidism       Prior to Visit Medications    Medication Sig Taking?  Authorizing Provider   metFORMIN (GLUCOPHAGE) 500 MG tablet Take 1 tablet by mouth 2 times daily (with meals) Yes KETAN Stovall   blood glucose monitor strips Test 1  times a day E88.81 Yes KETAN Stovall   Lancets MISC 1 each by Does not apply route daily E88.81 Yes KETAN Stovall   atorvastatin (LIPITOR) 20 MG tablet Take 1 tablet by mouth daily Yes KETAN Stovall   fluticasone (FLONASE) 50 MCG/ACT nasal spray 2 sprays by Each Nostril route daily Yes KETAN Stovall   montelukast (SINGULAIR) 10 MG tablet Take 1 tablet by mouth nightly Yes KETAN Stovall   amoxicillin-clavulanate (AUGMENTIN) 875-125 MG per tablet Take 1 tablet by mouth 2 times daily for 10 days Yes KETAN Stovall   scopolamine (TRANSDERM-SCOP, 1.5 MG,) transdermal patch Place 1 patch onto the skin every 72 hours for 9 days Yes KETAN Stovall      Allergies   Allergen Reactions    Nsaids        Review of Systems   Constitutional: Positive for fatigue. Negative for unexpected weight change. Review of system is essentially unchanged from previous visit  OBJECTIVE:    Physical Exam  Constitutional:       Appearance: Normal appearance. She is well-developed. She is not ill-appearing. HENT:      Head: Normocephalic and atraumatic. Right Ear: External ear normal.      Left Ear: External ear normal.      Nose: Nose normal.      Mouth/Throat:      Lips: Pink. Mouth: Mucous membranes are moist.   Eyes:      General: Lids are normal.      Extraocular Movements: Extraocular movements intact. Conjunctiva/sclera: Conjunctivae normal.   Cardiovascular:      Rate and Rhythm: Normal rate and regular rhythm. Heart sounds: Normal heart sounds. No murmur heard. Pulmonary:      Effort: Pulmonary effort is normal.      Breath sounds: Normal breath sounds. Musculoskeletal:      Cervical back: Normal range of motion. Skin:     General: Skin is warm and dry. Neurological:      Mental Status: She is alert and oriented to person, place, and time. Motor: No weakness or tremor. Coordination: Coordination is intact. Psychiatric:         Attention and Perception: Attention and perception normal.         Mood and Affect: Mood normal.         Speech: Speech normal.         Behavior: Behavior normal. Behavior is cooperative. Thought Content: Thought content normal.         Cognition and Memory: Cognition and memory normal.         Judgment: Judgment normal.        /78 (Site: Left Upper Arm, Position: Sitting, Cuff Size: Medium Adult)   Pulse 83   Temp 97.3 °F (36.3 °C) (Temporal)   Ht 5' 6\" (1.676 m)   Wt 190 lb (86.2 kg)   SpO2 98%   BMI 30.67 kg/m²      ASSESSMENT:      ICD-10-CM    1.  Insulin resistance  E88.81 metFORMIN (GLUCOPHAGE) 500 MG tablet     DME Order for Glucometer as OP     blood glucose monitor strips     Lancets MISC     Amb External Referral To Nutrition Services       PLAN:    Kerry Carrero: Discuss Labs  Return in 2 weeks with record of blood glucose    Diagnosis and orders for this visit are above.

## 2021-08-09 ENCOUNTER — OFFICE VISIT (OUTPATIENT)
Dept: FAMILY MEDICINE CLINIC | Age: 41
End: 2021-08-09
Payer: MEDICAID

## 2021-08-09 VITALS
OXYGEN SATURATION: 97 % | BODY MASS INDEX: 30.99 KG/M2 | HEART RATE: 81 BPM | RESPIRATION RATE: 20 BRPM | TEMPERATURE: 97 F | DIASTOLIC BLOOD PRESSURE: 82 MMHG | WEIGHT: 192 LBS | SYSTOLIC BLOOD PRESSURE: 124 MMHG

## 2021-08-09 DIAGNOSIS — Z12.31 ENCOUNTER FOR SCREENING MAMMOGRAM FOR MALIGNANT NEOPLASM OF BREAST: ICD-10-CM

## 2021-08-09 DIAGNOSIS — E88.81 INSULIN RESISTANCE: Primary | ICD-10-CM

## 2021-08-09 DIAGNOSIS — H66.004 RECURRENT ACUTE SUPPURATIVE OTITIS MEDIA OF RIGHT EAR WITHOUT SPONTANEOUS RUPTURE OF TYMPANIC MEMBRANE: ICD-10-CM

## 2021-08-09 PROCEDURE — 99213 OFFICE O/P EST LOW 20 MIN: CPT | Performed by: NURSE PRACTITIONER

## 2021-08-09 RX ORDER — SULFAMETHOXAZOLE AND TRIMETHOPRIM 800; 160 MG/1; MG/1
1 TABLET ORAL 2 TIMES DAILY
Qty: 20 TABLET | Refills: 0 | Status: SHIPPED | OUTPATIENT
Start: 2021-08-09 | End: 2021-08-19

## 2021-08-09 RX ORDER — LIRAGLUTIDE 6 MG/ML
0.6 INJECTION SUBCUTANEOUS DAILY
Qty: 2 PEN | Refills: 3 | Status: SHIPPED | OUTPATIENT
Start: 2021-08-09 | End: 2021-11-23

## 2021-08-09 ASSESSMENT — ENCOUNTER SYMPTOMS
RESPIRATORY NEGATIVE: 1
ABDOMINAL PAIN: 0
DIARRHEA: 1

## 2021-08-09 NOTE — PROGRESS NOTES
tablet Take 1 tablet by mouth 2 times daily for 10 days Yes KETAN Perez   blood glucose monitor strips Test 1  times a day E88.81 Yes KETAN Perez   Lancets MISC 1 each by Does not apply route daily E88.81 Yes KETAN Perez   atorvastatin (LIPITOR) 20 MG tablet Take 1 tablet by mouth daily Yes KETAN Perez   fluticasone (FLONASE) 50 MCG/ACT nasal spray 2 sprays by Each Nostril route daily Yes KETAN Perez   montelukast (SINGULAIR) 10 MG tablet Take 1 tablet by mouth nightly Yes KETAN Perez   metFORMIN (GLUCOPHAGE) 500 MG tablet Take 1 tablet by mouth 2 times daily (with meals)  Patient not taking: Reported on 8/9/2021  KETAN Perez     Allergies   Allergen Reactions    Nsaids      /82 (Site: Left Upper Arm, Position: Sitting, Cuff Size: Large Adult)   Pulse 81   Temp 97 °F (36.1 °C) (Temporal)   Resp 20   Wt 192 lb (87.1 kg)   SpO2 97%   BMI 30.99 kg/m²     Review of Systems   Constitutional: Negative for fever and unexpected weight change. Respiratory: Negative. Cardiovascular: Negative. Gastrointestinal: Positive for diarrhea (now resolved ). Negative for abdominal pain. OBJECTIVE:    Physical Exam  Constitutional:       Appearance: Normal appearance. She is well-developed. She is not ill-appearing. HENT:      Head: Normocephalic and atraumatic. Right Ear: External ear normal. Tympanic membrane is erythematous. Left Ear: External ear normal.      Nose: Nose normal.      Mouth/Throat:      Lips: Pink. Mouth: Mucous membranes are moist.   Eyes:      General: Lids are normal.      Extraocular Movements: Extraocular movements intact. Conjunctiva/sclera: Conjunctivae normal.   Cardiovascular:      Rate and Rhythm: Normal rate and regular rhythm. Heart sounds: Normal heart sounds. No murmur heard. Pulmonary:      Effort: Pulmonary effort is normal.      Breath sounds: Wheezing present. Musculoskeletal:      Cervical back: Normal range of motion. Skin:     General: Skin is warm and dry. Neurological:      Mental Status: She is alert and oriented to person, place, and time. Motor: No weakness or tremor. Coordination: Coordination is intact. Psychiatric:         Attention and Perception: Attention and perception normal.         Mood and Affect: Mood normal.         Speech: Speech normal.         Behavior: Behavior normal. Behavior is cooperative. Thought Content: Thought content normal.         Cognition and Memory: Cognition and memory normal.         Judgment: Judgment normal.        /82 (Site: Left Upper Arm, Position: Sitting, Cuff Size: Large Adult)   Pulse 81   Temp 97 °F (36.1 °C) (Temporal)   Resp 20   Wt 192 lb (87.1 kg)   SpO2 97%   BMI 30.99 kg/m²      ASSESSMENT:      ICD-10-CM    1. Insulin resistance  E88.81 Liraglutide (VICTOZA) 18 MG/3ML SOPN SC injection     Insulin Pen Needle 31G X 6 MM MISC     UNABLE TO FIND   2. Encounter for screening mammogram for malignant neoplasm of breast  Z12.31 [facility] DIGITAL SCREEN W OR WO CAD BILATERAL   3. Recurrent acute suppurative otitis media of right ear without spontaneous rupture of tympanic membrane  H66.004 sulfamethoxazole-trimethoprim (BACTRIM DS) 800-160 MG per tablet       PLAN:    Kerry Carrero: Results (discuss test results . glucophage is causing diarrhea . she can not take it . )  schedule pap     Diagnosis and orders for thisvisit are above. All patient questions answered. Pt voiced understanding. Reviewedhealth maintenance. Instructed to continue current medications, diet and exercise. Patient agreed with treatment plan. Follow up as directed.

## 2021-09-13 ENCOUNTER — OFFICE VISIT (OUTPATIENT)
Dept: FAMILY MEDICINE CLINIC | Age: 41
End: 2021-09-13
Payer: MEDICAID

## 2021-09-13 VITALS
BODY MASS INDEX: 31.5 KG/M2 | DIASTOLIC BLOOD PRESSURE: 78 MMHG | TEMPERATURE: 96.8 F | HEART RATE: 68 BPM | HEIGHT: 66 IN | RESPIRATION RATE: 20 BRPM | OXYGEN SATURATION: 98 % | WEIGHT: 196 LBS | SYSTOLIC BLOOD PRESSURE: 126 MMHG

## 2021-09-13 DIAGNOSIS — Z12.4 SCREENING FOR CERVICAL CANCER: Primary | ICD-10-CM

## 2021-09-13 PROCEDURE — 99396 PREV VISIT EST AGE 40-64: CPT | Performed by: NURSE PRACTITIONER

## 2021-09-13 RX ORDER — M-VIT,TX,IRON,MINS/CALC/FOLIC 27MG-0.4MG
1 TABLET ORAL DAILY
COMMUNITY

## 2021-09-13 NOTE — PROGRESS NOTES
Annual GYN Visit      Kerry Ng  YOB: 1980    Date of Service:  9/13/2021    Chief Complaint:   Edmonia Snellen is a 38 y.o. female who presents for routine annual gynecologic visit. HPI:  Patient is postmenopausal- No LMP recorded. Patient has had a hysterectomy. .  She complains of pain with intercourse. Menopausal/perimenopausal symptoms: none. Hormone therapy side effects: none. Allergies   Allergen Reactions    Nsaids      Outpatient Medications Marked as Taking for the 9/13/21 encounter (Office Visit) with KETAN Ascencio   Medication Sig Dispense Refill    Multiple Vitamins-Minerals (THERAPEUTIC MULTIVITAMIN-MINERALS) tablet Take 1 tablet by mouth daily      Liraglutide (VICTOZA) 18 MG/3ML SOPN SC injection Inject 0.6 mg into the skin daily 2 pen 3    Insulin Pen Needle 31G X 6 MM MISC 1 each by Does not apply route daily 100 each 3    UNABLE TO FIND One touch ultra 2 lancets 100 with 3 refills.  E 88.81 1 tablet 0    blood glucose monitor strips Test 1  times a day E88.81 100 strip 1    Lancets MISC 1 each by Does not apply route daily E88.81 100 each 0    atorvastatin (LIPITOR) 20 MG tablet Take 1 tablet by mouth daily 30 tablet 5    fluticasone (FLONASE) 50 MCG/ACT nasal spray 2 sprays by Each Nostril route daily 1 Bottle 0    montelukast (SINGULAIR) 10 MG tablet Take 1 tablet by mouth nightly 90 tablet 1       Preventive Care and Risk Factor Assessment  Health Maintenance   Topic Date Due    Hepatitis C screen  Never done    Pneumococcal 0-64 years Vaccine (1 of 2 - PPSV23) Never done    HIV screen  Never done    DTaP/Tdap/Td vaccine (1 - Tdap) Never done    Flu vaccine (1) Never done    Lipid screen  06/29/2022    A1C test (Diabetic or Prediabetic)  07/02/2022    COVID-19 Vaccine  Completed    Hepatitis A vaccine  Aged Out    Hepatitis B vaccine  Aged Out    Hib vaccine  Aged Out    Meningococcal (ACWY) vaccine  Aged Out      Hx abnormal PAP: yes - 2007  Sexual activity: single partner, contraception - none. Hx of STD:no  Hx of abnormal mammogram: no  Self-breast exams: no  FH of breast cancer: no  FH of GYN cancer: yes - ovarian Cancermother   Previous DEXA scan: thinks so   Exercise: aerobics 3 time(s) per week  Social History     Tobacco Use   Smoking Status Current Every Day Smoker    Packs/day: 0.50    Years: 10.00    Pack years: 5.00    Types: Cigarettes    Start date: 10/25/2004   Smokeless Tobacco Never Used      Social History     Substance and Sexual Activity   Alcohol Use Never    Alcohol/week: 0.0 standard drinks        Immunization History   Administered Date(s) Administered    COVID-19, Day Peter, PF, 30mcg/0.3mL 04/01/2021, 04/21/2021       Review of Systems:  Review of Systems  The      Wt Readings from Last 3 Encounters:   09/13/21 196 lb (88.9 kg)   08/09/21 192 lb (87.1 kg)   07/06/21 190 lb (86.2 kg)     BP Readings from Last 3 Encounters:   09/13/21 126/78   08/09/21 124/82   07/06/21 130/78       Physical Exam:  Vitals:    09/13/21 0710   BP: 126/78   Site: Left Upper Arm   Position: Sitting   Cuff Size: Medium Adult   Pulse: 68   Resp: 20   Temp: 96.8 °F (36 °C)   TempSrc: Temporal   SpO2: 98%   Weight: 196 lb (88.9 kg)   Height: 5' 6\" (1.676 m)      Body mass index is 31.64 kg/m². Constitutional: She is oriented to person, place, and time. She appears well-developed and well-nourished. No distress. Neck: No mass and no thyromegaly present. Cardiovascular: Normal rate, regular rhythm and normal heart sounds. No murmur heard. Pulmonary/Chest: Effort and breath sounds normal.   Abdominal: Soft, non-tender. No distension or masses. Genitourinary: normal external genitalia, vulva, vagina, cervix, uterus and adnexa. Breast exam:  breasts appear normal, no suspicious masses, no skin or nipple changes or axillary nodes. Neurological: She is alert and oriented to person, place, and time. Skin: Skin is warm and dry.  No rash noted. No erythema. Psychiatric: She has a normal mood and affect.  Her behavior is normal.     Assessment/Plan:  Kerry was seen today for gynecologic exam.    Diagnoses and all orders for this visit:    Screening for cervical cancer  -     PAP SMEAR; Future  -     PAP SMEAR    decline mammogram

## 2021-10-20 ENCOUNTER — OFFICE VISIT (OUTPATIENT)
Dept: PRIMARY CARE CLINIC | Age: 41
End: 2021-10-20
Payer: COMMERCIAL

## 2021-10-20 VITALS
RESPIRATION RATE: 18 BRPM | TEMPERATURE: 97.6 F | OXYGEN SATURATION: 97 % | SYSTOLIC BLOOD PRESSURE: 126 MMHG | HEART RATE: 74 BPM | WEIGHT: 196 LBS | BODY MASS INDEX: 31.64 KG/M2 | DIASTOLIC BLOOD PRESSURE: 86 MMHG

## 2021-10-20 DIAGNOSIS — J20.9 ACUTE BRONCHITIS, UNSPECIFIED ORGANISM: Primary | ICD-10-CM

## 2021-10-20 DIAGNOSIS — R05.9 COUGH: ICD-10-CM

## 2021-10-20 DIAGNOSIS — R06.2 WHEEZING: ICD-10-CM

## 2021-10-20 PROCEDURE — 99213 OFFICE O/P EST LOW 20 MIN: CPT | Performed by: NURSE PRACTITIONER

## 2021-10-20 PROCEDURE — 96372 THER/PROPH/DIAG INJ SC/IM: CPT | Performed by: NURSE PRACTITIONER

## 2021-10-20 RX ORDER — AZITHROMYCIN 250 MG/1
250 TABLET, FILM COATED ORAL SEE ADMIN INSTRUCTIONS
Qty: 6 TABLET | Refills: 0 | Status: SHIPPED | OUTPATIENT
Start: 2021-10-20 | End: 2021-10-25

## 2021-10-20 RX ORDER — TRIAMCINOLONE ACETONIDE 40 MG/ML
40 INJECTION, SUSPENSION INTRA-ARTICULAR; INTRAMUSCULAR ONCE
Status: COMPLETED | OUTPATIENT
Start: 2021-10-20 | End: 2021-10-20

## 2021-10-20 RX ORDER — BENZONATATE 100 MG/1
100 CAPSULE ORAL 3 TIMES DAILY PRN
Qty: 21 CAPSULE | Refills: 0 | Status: SHIPPED | OUTPATIENT
Start: 2021-10-20 | End: 2021-10-27

## 2021-10-20 RX ORDER — PROMETHAZINE HYDROCHLORIDE 6.25 MG/5ML
6.25 SYRUP ORAL EVERY 6 HOURS PRN
Qty: 175 ML | Refills: 1 | Status: SHIPPED | OUTPATIENT
Start: 2021-10-20 | End: 2021-10-27

## 2021-10-20 RX ORDER — ALBUTEROL SULFATE 90 UG/1
2 AEROSOL, METERED RESPIRATORY (INHALATION) 4 TIMES DAILY PRN
Qty: 18 G | Refills: 1 | Status: SHIPPED | OUTPATIENT
Start: 2021-10-20 | End: 2021-11-19

## 2021-10-20 RX ADMIN — TRIAMCINOLONE ACETONIDE 40 MG: 40 INJECTION, SUSPENSION INTRA-ARTICULAR; INTRAMUSCULAR at 16:10

## 2021-10-20 ASSESSMENT — ENCOUNTER SYMPTOMS
COUGH: 1
DIARRHEA: 1
ABDOMINAL PAIN: 0
NAUSEA: 0
VOMITING: 0
CHEST TIGHTNESS: 0
WHEEZING: 1
SHORTNESS OF BREATH: 0

## 2021-10-20 NOTE — PATIENT INSTRUCTIONS
Bronchitis with cough and intermittent wheezing. INJ steroid today, please monitor your blood sugar and food intake as we know this will elevate your blood sugar. Antibiotic, RX for cough prn and prn inhaler in case you need it. If symptoms worsen please FU with PCP.

## 2022-09-11 NOTE — TELEPHONE ENCOUNTER
I called in eye drops you can put eye drops in the ear but not ear drops in the eyes. Darell Shankar gotten a green check think this will be covered. chest pain Yes

## 2025-01-03 NOTE — PROGRESS NOTES
Continue lisinopril  As needed antihypertensives, no need for permissive hypertension   [address] [facility] [address] [address]  Dept: [phone]  Dept Fax: [phone]: [phone]     Visit type: Established patient    Reason for Visit: Cough (x 1 day) and Diarrhea (x 1 day)      Assessment and Plan       1. Acute bronchitis, unspecified organism  -     azithromycin (ZITHROMAX) 250 MG tablet; Take 1 tablet by mouth See Admin Instructions for 5 days 500mg on day 1 followed by 250mg on days 2 - 5, Disp-6 tablet, R-0Normal  -     promethazine (PHENERGAN) 6.25 MG/5ML syrup; Take 5 mLs by mouth every 6 hours as needed (cough may cause dizziness), Disp-175 mL, R-1Normal  2. Cough  -     albuterol sulfate HFA (VENTOLIN HFA) 108 (90 Base) MCG/ACT inhaler; Inhale 2 puffs into the lungs 4 times daily as needed for Wheezing, Disp-18 g, R-1May sub short acting inhaler as allowed per insuranceNormal  -     benzonatate (TESSALON) 100 MG capsule; Take 1 capsule by mouth 3 times daily as needed for Cough, Disp-21 capsule, R-0Normal  -     azithromycin (ZITHROMAX) 250 MG tablet; Take 1 tablet by mouth See Admin Instructions for 5 days 500mg on day 1 followed by 250mg on days 2 - 5, Disp-6 tablet, R-0Normal  -     promethazine (PHENERGAN) 6.25 MG/5ML syrup; Take 5 mLs by mouth every 6 hours as needed (cough may cause dizziness), Disp-175 mL, R-1Normal  3. Wheezing  -     triamcinolone acetonide (KENALOG-40) injection 40 mg; 40 mg, IntraMUSCular, ONCE, On Wed 10/20/21 at 1630, For 1 dose  -     albuterol sulfate HFA (VENTOLIN HFA) 108 (90 Base) MCG/ACT inhaler; Inhale 2 puffs into the lungs 4 times daily as needed for Wheezing, Disp-18 g, R-1May sub short acting inhaler as allowed per insuranceNormal      ICD-10-CM    1. Acute bronchitis, unspecified organism  J20.9 azithromycin (ZITHROMAX) 250 MG tablet     promethazine (PHENERGAN) 6.25 MG/5ML syrup   2.  Cough  R05.9 albuterol sulfate HFA (VENTOLIN HFA) 108 (90 Base) MCG/ACT inhaler Gastrointestinal: Positive for diarrhea. Negative for abdominal pain, nausea and vomiting. Allergic/Immunologic: Positive for environmental allergies. Neurological: Negative for headaches. Allergies   Allergen Reactions    Nsaids        Outpatient Medications Prior to Visit   Medication Sig Dispense Refill    Multiple Vitamins-Minerals (THERAPEUTIC MULTIVITAMIN-MINERALS) tablet Take 1 tablet by mouth daily      Liraglutide (VICTOZA) 18 MG/3ML SOPN SC injection Inject 0.6 mg into the skin daily 2 pen 3    Insulin Pen Needle 31G X 6 MM MISC 1 each by Does not apply route daily 100 each 3    Lancets MISC 1 each by Does not apply route daily E88.81 100 each 0    atorvastatin (LIPITOR) 20 MG tablet Take 1 tablet by mouth daily 30 tablet 5    UNABLE TO FIND One touch ultra 2 lancets 100 with 3 refills. E 88.81 1 tablet 0    blood glucose monitor strips Test 1  times a day E88.81 100 strip 1    fluticasone (FLONASE) 50 MCG/ACT nasal spray 2 sprays by Each Nostril route daily 1 Bottle 0    montelukast (SINGULAIR) 10 MG tablet Take 1 tablet by mouth nightly 90 tablet 1     No facility-administered medications prior to visit.         Past Medical History:   Diagnosis Date    Cancer ([facility] [address]     ovarian cancer    Hyperlipidemia     Hypertension     Hypothyroidism         Social History     Tobacco Use    Smoking status: Current Every Day Smoker     Packs/day: 0.50     Years: 10.00     Pack years: 5.00     Types: Cigarettes     Start date: 10/25/2004    Smokeless tobacco: Never Used   Substance Use Topics    Alcohol use: Never     Alcohol/week: 0.0 standard drinks        Past Surgical History:   Procedure Laterality Date    FOREIGN BODY REMOVAL Left     foot    INNER EAR SURGERY      OVARY REMOVAL Left 2007       Family History   Problem Relation Age of Onset    Cancer Mother         brain & skin    Diabetes Mother     High Blood Pressure Mother     High Cholesterol Mother     Heart Disease Father     High Cholesterol Father     High Blood Pressure Father     Cancer Father         prostrate    Cancer Maternal Grandmother     Cancer Maternal Grandfather     Cancer Paternal Grandmother     Cancer Paternal Grandfather        Objective       /86 (Site: Right Upper Arm, Position: Sitting)   Pulse 74   Temp 97.6 °F (36.4 °C) (Temporal)   Resp 18   Wt 196 lb (88.9 kg)   SpO2 97%   BMI 31.64 kg/m²   Physical Exam  Vitals and nursing note reviewed. Constitutional:       General: She is not in acute distress. Appearance: Normal appearance. She is not ill-appearing. HENT:      Head: Normocephalic. Right Ear: External ear normal.      Left Ear: External ear normal.   Eyes:      Pupils: Pupils are equal, round, and reactive to light. Cardiovascular:      Rate and Rhythm: Normal rate and regular rhythm. Heart sounds: Normal heart sounds. Pulmonary:      Effort: Pulmonary effort is normal. No respiratory distress. Breath sounds: Wheezing (faint intermittent expiratory wheeze) present. No rhonchi or rales. Chest:      Chest wall: No tenderness. Abdominal:      General: Bowel sounds are normal.      Palpations: Abdomen is soft. Skin:     General: Skin is warm and dry. Neurological:      Mental Status: She is alert and oriented to person, place, and time.                               Jorgito Verma, APRN - CNP